# Patient Record
Sex: FEMALE | Race: WHITE | Employment: OTHER
[De-identification: names, ages, dates, MRNs, and addresses within clinical notes are randomized per-mention and may not be internally consistent; named-entity substitution may affect disease eponyms.]

---

## 2017-02-02 ENCOUNTER — SURGERY (OUTPATIENT)
Age: 77
End: 2017-02-02

## 2017-02-02 ENCOUNTER — HOSPITAL ENCOUNTER (OUTPATIENT)
Age: 77
Setting detail: OUTPATIENT SURGERY
Discharge: HOME | End: 2017-02-02
Attending: INTERNAL MEDICINE | Admitting: INTERNAL MEDICINE
Payer: MEDICARE

## 2017-02-02 VITALS
SYSTOLIC BLOOD PRESSURE: 118 MMHG | DIASTOLIC BLOOD PRESSURE: 66 MMHG | RESPIRATION RATE: 18 BRPM | HEIGHT: 56 IN | WEIGHT: 120 LBS | HEART RATE: 72 BPM | BODY MASS INDEX: 26.99 KG/M2 | TEMPERATURE: 97.9 F | OXYGEN SATURATION: 97 %

## 2017-02-02 LAB
GLUCOSE BLD STRIP.AUTO-MCNC: 89 MG/DL (ref 65–100)
SERVICE CMNT-IMP: NORMAL

## 2017-02-02 PROCEDURE — 74011000258 HC RX REV CODE- 258: Performed by: INTERNAL MEDICINE

## 2017-02-02 PROCEDURE — 99153 MOD SED SAME PHYS/QHP EA: CPT

## 2017-02-02 PROCEDURE — 74011250636 HC RX REV CODE- 250/636: Performed by: INTERNAL MEDICINE

## 2017-02-02 PROCEDURE — 77030014243 HC BND LIG VRCES BSC -D: Performed by: INTERNAL MEDICINE

## 2017-02-02 PROCEDURE — 76040000019: Performed by: INTERNAL MEDICINE

## 2017-02-02 PROCEDURE — 99152 MOD SED SAME PHYS/QHP 5/>YRS: CPT

## 2017-02-02 PROCEDURE — 82962 GLUCOSE BLOOD TEST: CPT

## 2017-02-02 RX ORDER — SODIUM CHLORIDE 9 MG/ML
50 INJECTION, SOLUTION INTRAVENOUS CONTINUOUS
Status: DISCONTINUED | OUTPATIENT
Start: 2017-02-02 | End: 2017-02-02 | Stop reason: HOSPADM

## 2017-02-02 RX ORDER — SODIUM CHLORIDE 0.9 % (FLUSH) 0.9 %
5-10 SYRINGE (ML) INJECTION AS NEEDED
Status: DISCONTINUED | OUTPATIENT
Start: 2017-02-02 | End: 2017-02-02 | Stop reason: HOSPADM

## 2017-02-02 RX ORDER — FENTANYL CITRATE 50 UG/ML
50-200 INJECTION, SOLUTION INTRAMUSCULAR; INTRAVENOUS
Status: DISCONTINUED | OUTPATIENT
Start: 2017-02-02 | End: 2017-02-02 | Stop reason: HOSPADM

## 2017-02-02 RX ORDER — MIDAZOLAM HYDROCHLORIDE 1 MG/ML
5-10 INJECTION, SOLUTION INTRAMUSCULAR; INTRAVENOUS
Status: DISCONTINUED | OUTPATIENT
Start: 2017-02-02 | End: 2017-02-02 | Stop reason: HOSPADM

## 2017-02-02 RX ORDER — DEXTROMETHORPHAN/PSEUDOEPHED 2.5-7.5/.8
1.2 DROPS ORAL
Status: DISCONTINUED | OUTPATIENT
Start: 2017-02-02 | End: 2017-02-02 | Stop reason: HOSPADM

## 2017-02-02 RX ORDER — EPINEPHRINE 0.1 MG/ML
1 INJECTION INTRACARDIAC; INTRAVENOUS
Status: DISCONTINUED | OUTPATIENT
Start: 2017-02-02 | End: 2017-02-02 | Stop reason: HOSPADM

## 2017-02-02 RX ORDER — ATROPINE SULFATE 0.1 MG/ML
0.5 INJECTION INTRAVENOUS
Status: DISCONTINUED | OUTPATIENT
Start: 2017-02-02 | End: 2017-02-02 | Stop reason: HOSPADM

## 2017-02-02 RX ORDER — FLUMAZENIL 0.1 MG/ML
0.2 INJECTION INTRAVENOUS
Status: DISCONTINUED | OUTPATIENT
Start: 2017-02-02 | End: 2017-02-02 | Stop reason: HOSPADM

## 2017-02-02 RX ORDER — SODIUM CHLORIDE 0.9 % (FLUSH) 0.9 %
5-10 SYRINGE (ML) INJECTION EVERY 8 HOURS
Status: DISCONTINUED | OUTPATIENT
Start: 2017-02-02 | End: 2017-02-02 | Stop reason: HOSPADM

## 2017-02-02 RX ORDER — NALOXONE HYDROCHLORIDE 0.4 MG/ML
0.4 INJECTION, SOLUTION INTRAMUSCULAR; INTRAVENOUS; SUBCUTANEOUS
Status: DISCONTINUED | OUTPATIENT
Start: 2017-02-02 | End: 2017-02-02 | Stop reason: HOSPADM

## 2017-02-02 RX ADMIN — MIDAZOLAM HYDROCHLORIDE 2 MG: 1 INJECTION, SOLUTION INTRAMUSCULAR; INTRAVENOUS at 13:01

## 2017-02-02 RX ADMIN — FENTANYL CITRATE 50 MCG: 50 INJECTION, SOLUTION INTRAMUSCULAR; INTRAVENOUS at 12:56

## 2017-02-02 RX ADMIN — MIDAZOLAM HYDROCHLORIDE 2 MG: 1 INJECTION, SOLUTION INTRAMUSCULAR; INTRAVENOUS at 12:56

## 2017-02-02 RX ADMIN — FENTANYL CITRATE 25 MCG: 50 INJECTION, SOLUTION INTRAMUSCULAR; INTRAVENOUS at 13:01

## 2017-02-02 RX ADMIN — MIDAZOLAM HYDROCHLORIDE 1 MG: 1 INJECTION, SOLUTION INTRAMUSCULAR; INTRAVENOUS at 13:04

## 2017-02-02 RX ADMIN — FENTANYL CITRATE 25 MCG: 50 INJECTION, SOLUTION INTRAMUSCULAR; INTRAVENOUS at 13:04

## 2017-02-02 RX ADMIN — MIDAZOLAM HYDROCHLORIDE 1 MG: 1 INJECTION, SOLUTION INTRAMUSCULAR; INTRAVENOUS at 12:59

## 2017-02-02 RX ADMIN — SODIUM CHLORIDE 50 ML: 900 INJECTION, SOLUTION INTRAVENOUS at 13:31

## 2017-02-02 RX ADMIN — FENTANYL CITRATE 25 MCG: 50 INJECTION, SOLUTION INTRAMUSCULAR; INTRAVENOUS at 12:59

## 2017-02-02 NOTE — PROCEDURES
93 Alan Burgos MD, KATY Kim PA-C Guinevere Nice, MD, [address], MD Lilly Rinaldi NP Buckner Jest, NP [employer]     217 [facility], [address] [place] [address]  22.     [phone]     FAX: [address] [place] [address], [address] [facility], [address]     [phone]     FAX: [phone]         UPPER ENDOSCOPY PROCEDURE NOTE    Alden Melendez  1940    INDICATION: Cirrhosis. Screening for esophageal varices with variceal ligation if indicated. : Chaim Ford MD    ANESTHESIA/SEDATION: Versed 6 mg and Fentanyl 125 mcg    PROCEDURE DESCRIPTION:  Infomed consent was obtained from the patient for the procedure. All risks and benefits of the procedure explained. The patient was taken to the endoscopy suite and placed in the left lateral decubitus position. Following sequential administration of sedation to doses as indicated above the endoscope was inserted into the mouth and advanced under direct vision to the second portion of the duodenum. Careful inspection of upper gastrointestinal tract was made as the endoscope was inserted and withdrawn. Retroflexion of the endoscope to view of the cardia of the stomach was performed. After withdrawing the endoscope the banding devise was placed on the tip of the endoscope. The scope was then reinserted under direct inspection and advanced to the esophagus. Banding of esophageal varices was performed as described below. The scope was then removed. FINDINGS:  Esophagus:  A few small esophageal varices were identified. Banding of esophageal varices was performed. Excellent hemostasis was achieved after banding. Stomach:   Mild portal hypertensive gastropathy of the body of the stomach.   No gastric varicies identified. Duodenum:   Normal bulb and second portion    INTERVENTION:   1 bands placed were placed on esophageal varices. COMPLICATIONS: None. The patient tolerated the procedure well. EBL: Negligible. RECOMMENDATIONS:  Observe until discharge parameters are achieved. Liquid diet today. Soft food tomorrow. Resume general diet thereafter. Repeat endoscopy to reassess varices and need for additional banding in 6 months. Follow-up Liver [place] [facility] office as scheduled.       Chico Prasad MD  2/2/2017  1:10 PM

## 2017-02-02 NOTE — H&P
[address], MD, FACP, [facility]     April KATY Saavedra PA-C [place] Calin, MD, MD Lilly Jordan NP     [facility], KATY        1483 [facility], [address] [place] [address]  22.     [phone]     FAX: [address] [place] [address], [address] [place] [address], 300 [facility] [address]     [phone]     FAX: [phone]         PRE-PROCEDURE NOTE - EGD    H and P from last office visit reviewed. Allergies reviewed. Out-patient medicaton list reviewed. Patient Active Problem List   Diagnosis Code    Thrombocytopenia ([facility] [address] D69.6    Type II diabetes mellitus ([facility] [address] E11.9    Hypertension I10    Hypercholesterolemia E78.00    Cirrhosis, cryptogenic (HCC) K74.69       Allergies   Allergen Reactions    Percodan [Oxycodone Hcl-Oxycodone-Asa] Other (comments)       No current facility-administered medications on file prior to encounter. Current Outpatient Prescriptions on File Prior to Encounter   Medication Sig Dispense Refill    glipiZIDE SR (GLUCOTROL) 10 mg CR tablet TAKE ONE TABLET BY MOUTH EVERY DAY  5    lisinopril (PRINIVIL, ZESTRIL) 10 mg tablet TAKE ONE TABLET BY MOUTH EVERY DAY  5    metFORMIN (GLUCOPHAGE) 1,000 mg tablet TAKE ONE TABLET BY MOUTH TWICE A DAY WITH MEALS  5    sertraline (ZOLOFT) 50 mg tablet TAKE ONE (1) TABLET(S) ONCE DAILY  0    simvastatin (ZOCOR) 10 mg tablet TAKE ONE TABLET BY MOUTH EVERY DAY IN THE EVENING  5       For EGD to assess for esophageal and gastric varices. Plan to perform banding if indicated based upon variceal size and appearance. The risks of the procedure were discussed with the patient. These included reaction to anesthesia, pain, perforation and bleeding. All questions were answered. The patient wishes to proceed with the procedure.     PHYSICAL EXAMINATION:  VS: per nursing note  General: No acute distress. Eyes: Sclera anicteric. ENT: No oral lesions. Thyroid normal.  Nodes: No adenopathy. Skin: No spider angiomata. No jaundice. No palmar erythema. Respiratory: Lungs clear to auscultation. Cardiovascular: Regular heart rate. No murmurs. No JVD. Abdomen: Soft non-tender, liver size normal to percussion/palpation. Spleen not palpable. No obvious ascites. Extremities: No edema. No muscle wasting. No gross arthritic changes. Neurologic: Alert and oriented. Cranial nerves grossly intact. No asterixis. MOST RECENT LABORATORY STUDIES:  Lab Results   Component Value Date/Time    WBC 6.0 12/19/2016 03:25 PM    HGB 12.8 12/19/2016 03:25 PM    HCT 39.1 12/19/2016 03:25 PM    PLATELET 109 47/60/4025 03:25 PM    MCV 92 12/19/2016 03:25 PM     Lab Results   Component Value Date/Time    INR 1.1 12/19/2016 03:25 PM    Prothrombin time 11.6 12/19/2016 03:25 PM       ASSESSMENT AND PLAN:  EGD to assess for esophageal and/or gastric varices. Conscious sedation with fentanyl and versed.     Lionel Mcgee MD  [facility] [place] of [address] [place] [address] [address] [place] [address] Nevin Medrano[address] [facility]. [phone]

## 2017-02-02 NOTE — PROGRESS NOTES
[id number] Patient had EGD with Dr Lisa Hill. Monitored patient during conscious sedation without incident. See flow sheets. 1 Esophageal varices banded.

## 2017-02-02 NOTE — IP AVS SNAPSHOT
[address] 
[phone] Patient: Ladonna Glez MRN: [id number] [facility]:16[birth year]89/[birth year]26 You are allergic to the following Allergen Reactions Percodan (Oxycodone Hcl-Oxycodone-Asa) Other (comments) Recent Documentation Height Weight Breastfeeding? BMI OB Status Smoking Status 1.422 m 54.4 kg No 26.9 kg/m2 Menopause Former Smoker Emergency Contacts Name Discharge Info Relation Home Work Mobile RdbayleeJavier DISCHARGE CAREGIVER [3] Spouse [3] [phone] Kenrick Bardales  Spouse [3] [phone] About your hospitalization You were admitted on:  February 2, 2017 You last received care in the:  [facility] ENDOSCOPY You were discharged on:  February 2, 2017 Unit phone number:  [phone] Why you were hospitalized Your primary diagnosis was:  Not on File Providers Seen During Your Hospitalizations Provider Role Specialty Primary office phone Arch MD Anibal Attending Provider Hepatology [phone] Your Primary Care Physician (PCP) Primary Care Physician Office Phone Office Fax Edith Alexander [phone] Follow-up Information Follow up With Details Comments Contact Info Chapis Carroll MD   [address] [place] [address] [address] 
[phone] Your Appointments Thursday February 16, 2017  9:30 AM EST Fibroscan with Milena Villalta NP Liver [facility] ([facility]) [address] [place] [address] [phone] [address] [place] [address]  
[phone] Current Discharge Medication List  
  
CONTINUE these medications which have NOT CHANGED Dose & Instructions Dispensing Information Comments Morning Noon Evening Bedtime  
 glipiZIDE SR 10 mg CR tablet Commonly known as:  GLUCOTROL XL Your next dose is: Today, Tomorrow Other:  _________ TAKE ONE TABLET BY MOUTH EVERY DAY Refills:  5  
     
   
   
   
  
 lisinopril 10 mg tablet Commonly known as:  Monaolga SamsBenewah Your next dose is: Today, Tomorrow Other:  _________ TAKE ONE TABLET BY MOUTH EVERY DAY Refills:  5  
     
   
   
   
  
 metFORMIN 1,000 mg tablet Commonly known as:  GLUCOPHAGE Your next dose is: Today, Tomorrow Other:  _________ TAKE ONE TABLET BY MOUTH TWICE A DAY WITH MEALS Refills:  5  
     
   
   
   
  
 sertraline 50 mg tablet Commonly known as:  ZOLOFT Your next dose is: Today, Tomorrow Other:  _________ TAKE ONE (1) TABLET(S) ONCE DAILY Refills:  0  
     
   
   
   
  
 simvastatin 10 mg tablet Commonly known as:  ZOCOR Your next dose is: Today, Tomorrow Other:  _________ TAKE ONE TABLET BY MOUTH EVERY DAY IN THE EVENING Refills:  5 Discharge Instructions [address] [place] [address] Paula Alex MD, Keshav Schmidt, GIGI Larios MD, MD Lilly Cardenas NP Elmyra Pavy, NP 4630 [facility], [address] [place] [address] 22. 
   [phone] FAX: [address] [place] [address], [address] [place] [address], 300 [facility] [address] 
   [phone] FAX: [phone] ENDOSCOPY WITH BANDING DISCHARGE INSTRUCTIONS Zeeshan Justice 1940 Date: 2/2/2017 DISCOMFORT: 
Use lozenges or warm salt water gargle for sore thoat Apply warm compress to IV site if red. If redness or soreness persists call the office. You may experience gas and bloating. Walking and belching will help relieve this.  
You may experience chest pain or discomfort or feel as though food is \"sticking\" in your food pipe for a few days after the procedure. This is a normal feeling after banding of esophageal varices. DIET: 
Regular food may dislodge the bands placed on the varices. For this reason you should only have liquid for the rest of today. Eat only soft food that does not need to be chewed all day tomorrow. You may advance to your regular diet in 2 days. ACTIVITY: 
Spend the remainder of the day resting. Avoid any strenuous activity. You may not operate a vehicle for 12 hours. You may not engage in an occupation involving machinery or appliances for rest of today. Avoid making any critical decisions for at least 24 hour. Call the [place]  [email or web address] if you have any of the following: 
Increasing chest or abdominal pain, nausea, vomiting, vomiting blood, abdominal distension or swelling, fever or chills, bloody discharge from nose or mouth or shortness of breath. Follow-up Instructions: 
Call Dr. Devante Rolon for any questions or problems at the phone number listed above. If a biopsy was performed, you will be contacted by the office staff or Dr Devante Rolon within 1 week. If you have not heard from us by then you may call the office at the phone number listed above to inquire about the results. ENDOSCOPY FINDINGS: 
A few small varices were found in the esophagus (food tube). 1 bands were placed to seal the varices and reduce the risk of bleeding. DISCHARGE SUMMARY from the Nurse: The following personal items collected during your admission are returned to you:  
Dental Appliance: Dental Appliances: At home Vision: Visual Aid: Glasses, With patient Hearing Aid:   
Jewelry:   
Clothing:   
Other Valuables:   
Valuables sent to safe:   
 
 
 
  
Esophageal Varices: Care Instructions Your Care Instructions Esophageal varices (say \"ee-sof-uh-ALEXIS-ul MBNO-is-jifh\") are veins in your esophagus that are bigger than normal. Your esophagus is a tube.  It carries food from your throat to your stomach. These veins get big because of extra pressure. This pressure makes the walls of the veins weak. Then they can rupture and cause very serious bleeding. This problem is usually found in people who have serious liver disease. Treatments include medicines and procedures to help lower the pressure in the veins. Talk with your doctor about the best treatment for you. If you have bleeding from this problem, there is a risk that it will happen again. In this case, it's important to go back and follow up with your doctor. Follow-up care is a key part of your treatment and safety. Be sure to make and go to all appointments, and call your doctor if you are having problems. It's also a good idea to know your test results and keep a list of the medicines you take. How can you care for yourself at home? · Be safe with medicines. Take your medicines exactly as prescribed. Call your doctor if you think you are having a problem with your medicine. You will get more details on the specific medicines your doctor prescribes. · Talk to your doctor before you take any other medicines. These include over-the-counter medicines, vitamins, and herbal products. · Avoid aspirin, ibuprofen (Advil, Motrin), and naproxen (Aleve). These can cause sores in your stomach or esophagus. · Do not drink alcohol. It increases your risk of bleeding. It can also make liver damage worse. Tell your doctor if you need help to quit. Counseling, support groups, and sometimes medicines can help you stay sober. When should you call for help? Call 911 anytime you think you may need emergency care. For example, call if: 
· You vomit blood or what looks like coffee grounds. · Your stools are maroon or very bloody. · You passed out (lost consciousness). · You feel very sleepy. Call your doctor now or seek immediate medical care if: 
· You are dizzy or lightheaded, or you feel like you may faint. · Your stools are black and look like tar, or they have streaks of blood. · You have trouble breathing. Watch closely for changes in your health, and be sure to contact your doctor if you have any problems. Where can you learn more? Go to http://niesha-zeina.info/. Enter K396 in the search box to learn more about \"Esophageal Varices: Care Instructions. \" Current as of: August 9, 2016 Content Version: 11.1 © 5610-0094 OpenWhere. Care instructions adapted under license by [employer]ak[employer] (which disclaims liability or warranty for this information). If you have questions about a medical condition or this instruction, always ask your healthcare professional. [address] any warranty or liability for your use of this information. Discharge Orders None Introducing [facility]! Tessa Fuentes introduces AquaBlok patient portal. Now you can access parts of your medical record, email your doctor's office, and request medication refills online. 1. In your internet browser, go to https://5 CUPS and some sugar/Fluidigm 2. Click on the First Time User? Click Here link in the Sign In box. You will see the New Member Sign Up page. 3. Enter your AquaBlok Access Code exactly as it appears below. You will not need to use this code after youve completed the sign-up process. If you do not sign up before the expiration date, you must request a new code. · AquaBlok Access Code: [email or web address] Expires: 5/3/2017 11:14 AM 
 
4. Enter the last four digits of your Social Security Number (xxxx) and Date of Birth (mm/dd/yyyy) as indicated and click Submit. You will be taken to the next sign-up page. 5. Create a AquaBlok ID. This will be your AquaBlok login ID and cannot be changed, so think of one that is secure and easy to remember. 6. Create a Windtronicst password. You can change your password at any time. 7. Enter your Password Reset Question and Answer. This can be used at a later time if you forget your password. 8. Enter your e-mail address. You will receive e-mail notification when new information is available in [address] 9. Click Sign Up. You can now view and download portions of your medical record. 10. Click the Download Summary menu link to download a portable copy of your medical information. If you have questions, please visit the Frequently Asked Questions section of the Ciapple website. Remember, Ciapple is NOT to be used for urgent needs. For medical emergencies, dial 911. Now available from your iPhone and Android! General Information Please provide this summary of care documentation to your next provider. Patient Signature:  ____________________________________________________________ Date:  ____________________________________________________________  
  
[facility] Provider Signature:  ____________________________________________________________ Date:  ____________________________________________________________

## 2017-02-02 NOTE — DISCHARGE INSTRUCTIONS
93 Alan Burgos MD, [address] KATY Rios PA-C Pattricia Jesus, MD, [address], MD Lilly De Souza NP Edilia Smock, NP        [facility]     217 [facility], [address] [place] [address]  22.     [phone]     FAX: [address] [place] [address], [address] [facility], [address]     [phone]     FAX: [phone]         ENDOSCOPY WITH BANDING DISCHARGE INSTRUCTIONS    Nancy Johny  1940  Date: 2/2/2017    DISCOMFORT:  Use lozenges or warm salt water gargle for sore thoat  Apply warm compress to IV site if red. If redness or soreness persists call the office. You may experience gas and bloating. Walking and belching will help relieve this. You may experience chest pain or discomfort or feel as though food is \"sticking\" in your food pipe for a few days after the procedure. This is a normal feeling after banding of esophageal varices. DIET:  Regular food may dislodge the bands placed on the varices. For this reason you should only have liquid for the rest of today. Eat only soft food that does not need to be chewed all day tomorrow. You may advance to your regular diet in 2 days. ACTIVITY:  Spend the remainder of the day resting. Avoid any strenuous activity. You may not operate a vehicle for 12 hours. You may not engage in an occupation involving machinery or appliances for rest of today. Avoid making any critical decisions for at least 24 hour. Call the [address] [phone] of [phone] [email or web address] if you have any of the following:  Increasing chest or abdominal pain, nausea, vomiting, vomiting blood, abdominal distension or swelling, fever or chills, bloody discharge from nose or mouth or shortness of breath.     Follow-up Instructions:  Call Dr. Ev Dawkins for any questions or problems at the phone number listed above. If a biopsy was performed, you will be contacted by the office staff or Dr Pepe Rodriguez within 1 week. If you have not heard from us by then you may call the office at the phone number listed above to inquire about the results. ENDOSCOPY FINDINGS:  A few small varices were found in the esophagus (food tube). 1 bands were placed to seal the varices and reduce the risk of bleeding. DISCHARGE SUMMARY from the Nurse: The following personal items collected during your admission are returned to you:   Dental Appliance: Dental Appliances: At home  Vision: Visual Aid: Glasses, With patient  Hearing Aid:    Tonna Palma:    Clothing:    Other Valuables:    Valuables sent to safe:             Esophageal Varices: Care Instructions  Your Care Instructions    Esophageal varices (say \"ee-sof-uh-ALEXIS-ul GQNU-bt-qnwi\") are veins in your esophagus that are bigger than normal. Your esophagus is a tube. It carries food from your throat to your stomach. These veins get big because of extra pressure. This pressure makes the walls of the veins weak. Then they can rupture and cause very serious bleeding. This problem is usually found in people who have serious liver disease. Treatments include medicines and procedures to help lower the pressure in the veins. Talk with your doctor about the best treatment for you. If you have bleeding from this problem, there is a risk that it will happen again. In this case, it's important to go back and follow up with your doctor. Follow-up care is a key part of your treatment and safety. Be sure to make and go to all appointments, and call your doctor if you are having problems. It's also a good idea to know your test results and keep a list of the medicines you take. How can you care for yourself at home? · Be safe with medicines. Take your medicines exactly as prescribed. Call your doctor if you think you are having a problem with your medicine.  You will get more details on the specific medicines your doctor prescribes. · Talk to your doctor before you take any other medicines. These include over-the-counter medicines, vitamins, and herbal products. · Avoid aspirin, ibuprofen (Advil, Motrin), and naproxen (Aleve). These can cause sores in your stomach or esophagus. · Do not drink alcohol. It increases your risk of bleeding. It can also make liver damage worse. Tell your doctor if you need help to quit. Counseling, support groups, and sometimes medicines can help you stay sober. When should you call for help? Call 911 anytime you think you may need emergency care. For example, call if:  · You vomit blood or what looks like coffee grounds. · Your stools are maroon or very bloody. · You passed out (lost consciousness). · You feel very sleepy. Call your doctor now or seek immediate medical care if:  · You are dizzy or lightheaded, or you feel like you may faint. · Your stools are black and look like tar, or they have streaks of blood. · You have trouble breathing. Watch closely for changes in your health, and be sure to contact your doctor if you have any problems. Where can you learn more? Go to http://niesha-zeina.info/. Enter P713 in the search box to learn more about \"Esophageal Varices: Care Instructions. \"  Current as of: August 9, 2016  Content Version: 11.1  © 6854-5903 [employer]. Care instructions adapted under license by [employer] Her[employer] (which disclaims liability or warranty for this information). If you have questions about a medical condition or this instruction, always ask your healthcare professional. [address] any warranty or liability for your use of this information.

## 2017-02-16 ENCOUNTER — OFFICE VISIT (OUTPATIENT)
Dept: HEMATOLOGY | Age: 77
End: 2017-02-16

## 2017-02-16 VITALS
RESPIRATION RATE: 18 BRPM | DIASTOLIC BLOOD PRESSURE: 61 MMHG | TEMPERATURE: 100.1 F | HEIGHT: 56 IN | HEART RATE: 74 BPM | WEIGHT: 122.5 LBS | SYSTOLIC BLOOD PRESSURE: 128 MMHG | OXYGEN SATURATION: 95 % | BODY MASS INDEX: 27.56 KG/M2

## 2017-02-16 DIAGNOSIS — D69.6 THROMBOCYTOPENIA (HCC): ICD-10-CM

## 2017-02-16 DIAGNOSIS — K74.69 CIRRHOSIS, CRYPTOGENIC (HCC): Primary | ICD-10-CM

## 2017-02-16 NOTE — PROGRESS NOTES
Estephania Shankar MD, [address]     April KATY Preciado PA-C Sloan Blade, MD, [address], MD Amy Maeola Romance, NP Erving Rinne, NP        [address] [facility], [address] Tal Ramirez  22.     [phone]     FAX: [address] [place] [address] Drive, [address] [facility], [address]     [phone]     FAX: [phone]     Patient Care Team:  Geri Stevens MD as PCP - General (Family Practice)    Patient Active Problem List   Diagnosis Code    Thrombocytopenia [facility]) D69.6    Type II diabetes mellitus ([facility] [address] E11.9    Hypertension I10    Hypercholesterolemia E78.00    Cirrhosis, cryptogenic ([facility] [address] K74.69       Jesus[facility]pher[facility] regarding management of cryptogenic cirrhosis. The active problem list, all pertinent past medical history, medications, radiologic findings and laboratory findings related to the liver disorder were reviewed with the patient. The patient is a 68 y.o.  female who was first found to have chronic liver disease and cirrhosis in 11/2016 after she was noted to have thrombocytopenia. An assessment of liver fibrosis with biopsy or elastography has not been performed. Patient presents to the clinic today for a Fibroscan to assess liver fibrosis or scarring. The patient has not developed any major complications of cirrhosis to date. The patient has not developed ascites. The patient has not developed hepatic encephalopathy. The most recent laboratory studies indicate that the liver transaminases are normal, ALP is normal, tests of hepatic synthetic and metabolic function are normal, and the platelet count is depressed. The patient has no symptoms which could be attributed to the liver disorder.  The patient completes all daily activities without any functional limitations. The patient has not experienced fatigue, pain in the right side over the liver, problems concentrating, swelling of the abdomen, swelling of the lower extremities, hematemesis, hematochezia. ALLERGIES  Allergies   Allergen Reactions    Percodan [Oxycodone Hcl-Oxycodone-Asa] Other (comments)     MEDICATIONS  Current Outpatient Prescriptions   Medication Sig    glipiZIDE SR (GLUCOTROL) 10 mg CR tablet TAKE ONE TABLET BY MOUTH EVERY DAY    lisinopril (PRINIVIL, ZESTRIL) 10 mg tablet TAKE ONE TABLET BY MOUTH EVERY DAY    metFORMIN (GLUCOPHAGE) 1,000 mg tablet TAKE ONE TABLET BY MOUTH TWICE A DAY WITH MEALS    sertraline (ZOLOFT) 50 mg tablet TAKE ONE (1) TABLET(S) ONCE DAILY    simvastatin (ZOCOR) 10 mg tablet TAKE ONE TABLET BY MOUTH EVERY DAY IN THE EVENING     No current facility-administered medications for this visit. SYSTEM REVIEW NOT RELATED TO LIVER DISEASE OR REVIEWED ABOVE:  Constitution systems: Negative for fever, chills, weight gain, weight loss. Eyes: Negative for visual changes. ENT: Negative for sore throat, painful swallowing. Respiratory: Negative for cough, hemoptysis, SOB. Cardiology: Negative for chest pain, palpitations. GI:  Negative for constipation or diarrhea. : Negative for urinary frequency, dysuria, hematuria, nocturia. Skin: Negative for rash. Hematology: Negative for easy bruising, blood clots. Musculo-skelatal: Negative for back pain, muscle pain, weakness. Neurologic: Negative for headaches, dizziness, vertigo, memory problems not related to HE. Psychology: Negative for anxiety, depression. FAMILY HISTORY:  The father  of cirrhosis probably from chronic HCV. The mother  of cancer. There is no family history of liver disease. SOCIAL HISTORY:  The patient is . The patient has 2 children, 4 stepchildren, and alot grandchildren. The patient has never used tobacco products.     The patient has never consumed significant amounts of alcohol. The patient used to work in [employer]. The patient has not worked since 2009. PHYSICAL EXAMINATION:  Visit Vitals    /61 (BP 1 Location: Right arm, BP Patient Position: Sitting)    Pulse 74    Temp 100.1 °F (37.8 °C) (Tympanic)    Resp 18    Ht 4' 8\" (1.422 m)    Wt 122 lb 8 oz (55.6 kg)    SpO2 95%    BMI 27.46 kg/m2     General: No acute distress. Eyes: Sclera anicteric. ENT: No oral lesions. Thyroid normal.  Nodes: No adenopathy. Skin: No spider angiomata. No jaundice. No palmar erythema. Respiratory: Lungs clear to auscultation. Cardiovascular: Regular heart rate. No murmurs. No JVD. Abdomen: Soft non-tender. Liver size normal to percussion/palpation. Spleen not palpable. No obvious ascites. Extremities: No edema. No muscle wasting. No gross arthritic changes. Neurologic: Alert and oriented. Cranial nerves grossly intact. No asterixis. LABORATORY STUDIES:  Liver [facility] Ref Rng & Units 2/16/2017 12/19/2016   WBC 3.4 - 10.8 x10E3/uL  6.0   ANC 1.4 - 7.0 x10E3/uL  3.7   HGB 11.1 - 15.9 g/dL  12.8    - 379 x10E3/uL  102 (L)   PLT       - 379 X10E3/uL     INR 0.8 - 1.2  1.1   AST 0 - 40 IU/L 40 35   ALT 0 - 32 IU/L 31 21   Alk Phos 39 - 117 IU/L 73 61   Bili, Total 0.0 - 1.2 mg/dL 0.6 0.8   Bili, Direct 0.00 - 0.40 mg/dL  0.30   Albumin 3.5 - 4.8 g/dL 3.9 4.5   BUN 8 - 27 mg/dL 12 17   Creat 0.57 - 1.00 mg/dL 0.93 0.86   Na 134 - 144 mmol/L 146 (H) 145 (H)   K 3.5 - 5.2 mmol/L 5.0 4.7   Cl 96 - 106 mmol/L 106 106   CO2 18 - 29 mmol/L 22 21   Glucose 65 - 99 mg/dL 86 84     SEROLOGIES:  Serologies Latest Ref Rng 9/29/2016   Hep B Surface Ag Negative Negative   Hep B Core Ab, Total Negative Negative   Hep B Surface AB QL  Reactive   Hep C Ab 0.0 - 0.9 s/co ratio <0.1   Ferritin 15 - 150 ng/mL 55     LIVER HISTOLOGY:  2/16/2017. [facility]. EkPa was 21.1. Suggested fibrosis level is F4. ENDOSCOPIC PROCEDURES:  2/2017. EGD by MLS. A few small esophageal varices were identified. Banding of esophageal varices was performed. Mild portal hypertensive gastropathy of the body of the stomach. No gastric varicies identified. RADIOLOGY:  10/2016. Ultrasound of liver. Echogenic consistent with cirrhosis. No liver mass lesions. No dilated bile ducts. No ascites. 11/2016. MRI scan abdomen. Changes consistent with cirrhosis. No liver mass lesions. No dilated bile ducts. No bile duct strictures. No ascites. 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 Results were discussed in detail with patient. This is most likely due to MANUEL. Liver transaminases are normal. Alkaline phosphatase is normal. Liver function is normal. The platelet count is normal.      Have performed laboratory testing to monitor liver function and degree of liver injury. This will include CMP. There is no reason to perform liver biopsy at this time. The patient has never developed any complications of cirrhosis to date. The CTP is 5. Child class A. The MELD score is 10. Esophageal varices. Recent EGD demonstrated small varices and were banded. Patient will be considered for [place] in the portal hypertension clinical trial.     Hepatic encephalopathy has not developed to date. There is no need for treatment with lactulose and/or Xifaxan at this time. No need to restrict dietary protein at this time. The patient was directed to continue all current medications at the current dosages. There are no contraindications for the patient to take any medications that are necessary for treatment of other medical issues. The patient was counseled regarding alcohol consumption. Thrombocytopenia secondary to cirrhosis. There is no evidence of overt bleeding.  There is no indication for platelet transfusions or pharmacologic treatment to increase the platelet count. Bone density to assess for osteoporosis has not been performed. The need for vaccination against viral hepatitis A and B will be assessed with serologic and instituted as appropriate. [facility] [address] screening has recently been performed and does not suggest [address] The next liver imaging study will be performed in 5/2017. All of the above issues were discussed with the patient. All questions were answered. The patient expressed a clear understanding of the above. [address] [place] [address] in 3-4 months.     Rafael Monahan NP  Liver [place] [address]  Ph: [phone]  Fax: [phone]  Email: [email or web address]

## 2017-02-16 NOTE — MR AVS SNAPSHOT
Visit Information Date & Time Provider Department Dept. Phone Encounter #  
 2/16/2017  9:30 AM April Luke Rojas NP [facility] [address] [phone] [id number] Follow-up Instructions Return in about 4 months (around 6/16/2017). Upcoming Health Maintenance Date Due HEMOGLOBIN A1C Q6M 1940 LIPID PANEL Q1 1940 FOOT EXAM Q1 11/12/1950 MICROALBUMIN Q1 11/12/1950 EYE EXAM RETINAL OR DILATED Q1 11/12/1950 DTaP/Tdap/Td series (1 - Tdap) 11/12/1961 ZOSTER VACCINE AGE 60> 11/12/2000 GLAUCOMA SCREENING Q2Y 11/12/2005 OSTEOPOROSIS SCREENING (DEXA) 11/12/2005 Pneumococcal 65+ Low/Medium Risk (1 of 2 - PCV13) 11/12/2005 MEDICARE YEARLY EXAM 11/12/2005 INFLUENZA AGE 9 TO ADULT 8/1/2016 Allergies as of 2/16/2017  Review Complete On: 2/16/2017 By: Clemente Whittaker Severity Noted Reaction Type Reactions Percodan [Oxycodone Hcl-oxycodone-asa]  09/29/2016    Other (comments) Current Immunizations  Reviewed on 10/25/2016 No immunizations on file. Not reviewed this visit You Were Diagnosed With   
  
 Codes Comments Cirrhosis, cryptogenic ([facility] [address]    -  Primary ICD-10-CM: P72.15 ICD-9-CM: 571.5 Thrombocytopenia ([facility] [address]     ICD-10-CM: D69.6 ICD-9-CM: 287.5 Vitals BP Pulse Temp Resp Height(growth percentile) 128/61 (BP 1 Location: Right arm, BP Patient Position: Sitting) 74 100.1 °F (37.8 °C) (Tympanic) 18 4' 8\" (1.422 m) Weight(growth percentile) SpO2 BMI OB Status Smoking Status 122 lb 8 oz (55.6 kg) 95% 27.46 kg/m2 Menopause Former Smoker BMI and BSA Data Body Mass Index Body Surface Area  
 27.46 kg/m 2 1.48 m 2 Preferred Pharmacy Pharmacy Name Phone [facility] [place] [address] [phone] Your Updated Medication List  
  
   
This list is accurate as of: 2/16/17 10:05 AM.  Always use your most recent med list.  
  
  
 glipiZIDE SR 10 mg CR tablet Commonly known as:  GLUCOTROL XL  
TAKE ONE TABLET BY MOUTH EVERY DAY  
  
 lisinopril 10 mg tablet Commonly known as:  PRINIVIL, ZESTRIL  
TAKE ONE TABLET BY MOUTH EVERY DAY  
  
 metFORMIN 1,000 mg tablet Commonly known as:  GLUCOPHAGE  
TAKE ONE TABLET BY MOUTH TWICE A DAY WITH MEALS  
  
 sertraline 50 mg tablet Commonly known as:  ZOLOFT  
TAKE ONE (1) TABLET(S) ONCE DAILY  
  
 simvastatin 10 mg tablet Commonly known as:  ZOCOR  
TAKE ONE TABLET BY MOUTH EVERY DAY IN THE EVENING We Performed the Following METABOLIC PANEL, COMPREHENSIVE [19739 CPT(R)] Follow-up Instructions Return in about 4 months (around 6/16/2017). To-Do List   
 05/16/2017 Imaging:  [facility] Introducing [facility] & HEALTH SERVICES! [facility] introduces YEVVO patient portal. Now you can access parts of your medical record, email your doctor's office, and request medication refills online. 1. In your internet browser, go to [email or web address] 2. Click on the First Time User? Click Here link in the Sign In box. You will see the New Member Sign Up page. 3. Enter your YEVVO Access Code exactly as it appears below. You will not need to use this code after youve completed the sign-up process. If you do not sign up before the expiration date, you must request a new code. · YEVVO Access Code: [email or web address] Expires: 5/3/2017 11:14 AM 
 
4. Enter the last four digits of your Social Security Number (xxxx) and Date of Birth (mm/dd/yyyy) as indicated and click Submit. You will be taken to the next sign-up page. 5. Create a YEVVO ID. This will be your YEVVO login ID and cannot be changed, so think of one that is secure and easy to remember. 6. Create a YEVVO password. You can change your password at any time. 7. Enter your Password Reset Question and Answer. This can be used at a later time if you forget your password. 8. Enter your e-mail address. You will receive e-mail notification when new information is available in [address] 9. Click Sign Up. You can now view and download portions of your medical record. 10. Click the Download Summary menu link to download a portable copy of your medical information. If you have questions, please visit the Frequently Asked Questions section of the Lenda website. Remember, Lenda is NOT to be used for urgent needs. For medical emergencies, dial 911. Now available from your iPhone and Android! Please provide this summary of care documentation to your next provider. Your primary care clinician is listed as Tanja Hazel. If you have any questions after today's visit, please call [phone].

## 2017-02-17 LAB
ALBUMIN SERPL-MCNC: 3.9 G/DL (ref 3.5–4.8)
ALBUMIN/GLOB SERPL: 1.6 {RATIO} (ref 1.1–2.5)
ALP SERPL-CCNC: 73 IU/L (ref 39–117)
ALT SERPL-CCNC: 31 IU/L (ref 0–32)
AST SERPL-CCNC: 40 IU/L (ref 0–40)
BILIRUB SERPL-MCNC: 0.6 MG/DL (ref 0–1.2)
BUN SERPL-MCNC: 12 MG/DL (ref 8–27)
BUN/CREAT SERPL: 13 (ref 11–26)
CALCIUM SERPL-MCNC: 8.9 MG/DL (ref 8.7–10.3)
CHLORIDE SERPL-SCNC: 106 MMOL/L (ref 96–106)
CO2 SERPL-SCNC: 22 MMOL/L (ref 18–29)
CREAT SERPL-MCNC: 0.93 MG/DL (ref 0.57–1)
GLOBULIN SER CALC-MCNC: 2.5 G/DL (ref 1.5–4.5)
GLUCOSE SERPL-MCNC: 86 MG/DL (ref 65–99)
POTASSIUM SERPL-SCNC: 5 MMOL/L (ref 3.5–5.2)
PROT SERPL-MCNC: 6.4 G/DL (ref 6–8.5)
SODIUM SERPL-SCNC: 146 MMOL/L (ref 134–144)

## 2017-05-09 ENCOUNTER — HOSPITAL ENCOUNTER (OUTPATIENT)
Dept: ULTRASOUND IMAGING | Age: 77
Discharge: HOME | End: 2017-05-09
Attending: NURSE PRACTITIONER
Payer: MEDICARE

## 2017-05-09 DIAGNOSIS — K74.69 CIRRHOSIS, CRYPTOGENIC (HCC): ICD-10-CM

## 2017-05-09 DIAGNOSIS — D69.6 THROMBOCYTOPENIA (HCC): ICD-10-CM

## 2017-05-09 PROCEDURE — 76705 ECHO EXAM OF ABDOMEN: CPT

## 2017-06-19 ENCOUNTER — OFFICE VISIT (OUTPATIENT)
Dept: HEMATOLOGY | Age: 77
End: 2017-06-19

## 2017-06-19 VITALS
SYSTOLIC BLOOD PRESSURE: 141 MMHG | WEIGHT: 122 LBS | TEMPERATURE: 97.9 F | BODY MASS INDEX: 27.35 KG/M2 | DIASTOLIC BLOOD PRESSURE: 61 MMHG | HEART RATE: 75 BPM | OXYGEN SATURATION: 95 %

## 2017-06-19 DIAGNOSIS — K76.6 PORTAL HYPERTENSION (HCC): ICD-10-CM

## 2017-06-19 DIAGNOSIS — I85.10 SECONDARY ESOPHAGEAL VARICES WITHOUT BLEEDING (HCC): ICD-10-CM

## 2017-06-19 DIAGNOSIS — D69.6 THROMBOCYTOPENIA (HCC): ICD-10-CM

## 2017-06-19 DIAGNOSIS — K74.69 CIRRHOSIS, CRYPTOGENIC (HCC): Primary | ICD-10-CM

## 2017-06-19 NOTE — MR AVS SNAPSHOT
Visit Information Date & Time Provider Department Dept. Phone Encounter #  
 6/19/2017 11:30 AM Milena Dodson NP [facility] [address] [facility] [id number] Your Appointments 6/19/2017 11:30 AM  
Follow Up with Milena Minaya NP [facility] [address] ([facility] Appt Note: Follow Up  
 [address] [facility] [phone] [facility] [address]  
[address] [facility] [address] Upcoming Health Maintenance Date Due HEMOGLOBIN A1C Q6M 1940 LIPID PANEL Q1 1940 FOOT EXAM Q1 11/12/1950 MICROALBUMIN Q1 11/12/1950 EYE EXAM RETINAL OR DILATED Q1 11/12/1950 DTaP/Tdap/Td series (1 - Tdap) 11/12/1961 ZOSTER VACCINE AGE 60> 11/12/2000 GLAUCOMA SCREENING Q2Y 11/12/2005 OSTEOPOROSIS SCREENING (DEXA) 11/12/2005 Pneumococcal 65+ Low/Medium Risk (1 of 2 - PCV13) 11/12/2005 MEDICARE YEARLY EXAM 11/12/2005 INFLUENZA AGE 9 TO ADULT 8/1/2017 Allergies as of 6/19/2017  Review Complete On: 6/19/2017 By: Milena Minaya NP Severity Noted Reaction Type Reactions Percodan [Oxycodone Hcl-oxycodone-asa]  09/29/2016    Other (comments) Current Immunizations  Reviewed on 10/25/2016 No immunizations on file. Not reviewed this visit You Were Diagnosed With   
  
 Codes Comments Cirrhosis, cryptogenic ([address]    -  Primary ICD-10-CM: W19.59 ICD-9-CM: 571.5 Thrombocytopenia ([address]     ICD-10-CM: D69.6 ICD-9-CM: 287.5 Portal hypertension (HCC)     ICD-10-CM: K76.6 ICD-9-CM: 572.3 Secondary esophageal varices without bleeding (HCC)     ICD-10-CM: I85.10 ICD-9-CM: 456.21 Vitals BP Pulse Temp Weight(growth percentile) SpO2 BMI  
 141/61 75 97.9 °F (36.6 °C) (Oral) 122 lb (55.3 kg) 95% 27.35 kg/m2 OB Status Smoking Status Menopause Former Smoker BMI and BSA Data Body Mass Index Body Surface Area 27.35 kg/m 2 1.48 m 2 Preferred Pharmacy Pharmacy Name Phone [facility] [place]  [phone] Your Updated Medication List  
  
   
This list is accurate as of: 6/19/17 11:28 AM.  Always use your most recent med list.  
  
  
  
  
 glipiZIDE SR 10 mg CR tablet Commonly known as:  GLUCOTROL XL  
TAKE ONE TABLET BY MOUTH EVERY DAY  
  
 lisinopril 10 mg tablet Commonly known as:  PRINIVIL, ZESTRIL  
TAKE ONE TABLET BY MOUTH EVERY DAY  
  
 metFORMIN 1,000 mg tablet Commonly known as:  GLUCOPHAGE  
TAKE ONE TABLET BY MOUTH TWICE A DAY WITH MEALS  
  
 sertraline 50 mg tablet Commonly known as:  ZOLOFT  
TAKE ONE (1) TABLET(S) ONCE DAILY  
  
 simvastatin 10 mg tablet Commonly known as:  ZOCOR  
TAKE ONE TABLET BY MOUTH EVERY DAY IN THE EVENING We Performed the Following CBC W/O DIFF [69896 CPT(R)] METABOLIC PANEL, COMPREHENSIVE [53034 CPT(R)] PROTHROMBIN TIME + INR [44139 CPT(R)] UPPER GI ENDOSCOPY,DIAGNOSIS [32983 CPT(R)] Comments:  
 Shahrzad Cruz 2017 Introducing \[facility] [place] [facility]\"" & HEALTH SERVICES! Idalmis Jefferson introduces CrossReader patient portal. Now you can access parts of your medical record, email your doctor's office, and request medication refills online. 1. In your internet browser, go to [email or web address] 2. Click on the First Time User? Click Here link in the Sign In box. You will see the New Member Sign Up page. 3. Enter your CrossReader Access Code exactly as it appears below. You will not need to use this code after youve completed the sign-up process. If you do not sign up before the expiration date, you must request a new code. · CrossReader Access Code: [email or web address] Expires: 9/17/2017 11:28 AM 
 
4. Enter the last four digits of your Social Security Number (xxxx) and Date of Birth (mm/dd/yyyy) as indicated and click Submit. You will be taken to the next sign-up page. 5. Create a Passworks ID. This will be your Passworks login ID and cannot be changed, so think of one that is secure and easy to remember. 6. Create a Passworks password. You can change your password at any time. 7. Enter your Password Reset Question and Answer. This can be used at a later time if you forget your password. 8. Enter your e-mail address. You will receive e-mail notification when new information is available in [address] 9. Click Sign Up. You can now view and download portions of your medical record. 10. Click the Download Summary menu link to download a portable copy of your medical information. If you have questions, please visit the Frequently Asked Questions section of the Passworks website. Remember, Passworks is NOT to be used for urgent needs. For medical emergencies, dial 911. Now available from your iPhone and Android! Please provide this summary of care documentation to your next provider. Your primary care clinician is listed as Coleman Sterling. If you have any questions after today's visit, please call [phone].

## 2017-06-20 LAB
ALBUMIN SERPL-MCNC: 4.5 G/DL (ref 3.5–4.8)
ALBUMIN/GLOB SERPL: 2 {RATIO} (ref 1.2–2.2)
ALP SERPL-CCNC: 87 IU/L (ref 39–117)
ALT SERPL-CCNC: 22 IU/L (ref 0–32)
AST SERPL-CCNC: 37 IU/L (ref 0–40)
BILIRUB SERPL-MCNC: 1 MG/DL (ref 0–1.2)
BUN SERPL-MCNC: 16 MG/DL (ref 8–27)
BUN/CREAT SERPL: 15 (ref 12–28)
CALCIUM SERPL-MCNC: 9.3 MG/DL (ref 8.7–10.3)
CHLORIDE SERPL-SCNC: 105 MMOL/L (ref 96–106)
CO2 SERPL-SCNC: 22 MMOL/L (ref 18–29)
CREAT SERPL-MCNC: 1.09 MG/DL (ref 0.57–1)
ERYTHROCYTE [DISTWIDTH] IN BLOOD BY AUTOMATED COUNT: 14.5 % (ref 12.3–15.4)
GLOBULIN SER CALC-MCNC: 2.2 G/DL (ref 1.5–4.5)
GLUCOSE SERPL-MCNC: 97 MG/DL (ref 65–99)
HCT VFR BLD AUTO: 36.5 % (ref 34–46.6)
HGB BLD-MCNC: 12 G/DL (ref 11.1–15.9)
INR PPP: 1.2 (ref 0.8–1.2)
MCH RBC QN AUTO: 29.9 PG (ref 26.6–33)
MCHC RBC AUTO-ENTMCNC: 32.9 G/DL (ref 31.5–35.7)
MCV RBC AUTO: 91 FL (ref 79–97)
MORPHOLOGY BLD-IMP: ABNORMAL
PLATELET # BLD AUTO: 84 X10E3/UL (ref 150–379)
POTASSIUM SERPL-SCNC: 5.1 MMOL/L (ref 3.5–5.2)
PROT SERPL-MCNC: 6.7 G/DL (ref 6–8.5)
PROTHROMBIN TIME: 11.9 SEC (ref 9.1–12)
RBC # BLD AUTO: 4.02 X10E6/UL (ref 3.77–5.28)
SODIUM SERPL-SCNC: 145 MMOL/L (ref 134–144)
WBC # BLD AUTO: 4.5 X10E3/UL (ref 3.4–10.8)

## 2017-06-20 NOTE — PROGRESS NOTES
93 Alan Burgos MD, KATY Mcleod PA-C Alissa Rama, NP        at [address] [facility] [address]     [address] [place] [address], [address] Tal Ramirez  22.     [phone]     FAX: [phone]    at [address], [address] [place] [address], [address]     [phone]     FAX: [phone]     Patient Care Team:  Elham Carranza MD as PCP - General (Family Practice)    Patient Active Problem List   Diagnosis Code    Thrombocytopenia ([facility] D69.6    Type II diabetes mellitus ([facility] E11.9    Hypertension I10    Hypercholesterolemia E78.00    Cirrhosis, cryptogenic ([facility] K74.69    Secondary esophageal varices without bleeding ([facility] I85.10       Michael Giang [facility] to the [facility] regarding management of cryptogenic cirrhosis. The active problem list, all pertinent past medical history, medications, radiologic findings and laboratory findings related to the liver disorder were reviewed with the patient. The patient is a 68 y.o.  female who was first found to have chronic liver disease and cirrhosis in 11/2016 after she was noted to have thrombocytopenia. An assessment of liver fibrosis with biopsy or elastography has been performed and was consistent with cirrhosis. The patient has not developed any major complications of cirrhosis to date. The patient has not developed ascites. The patient has not developed hepatic encephalopathy. The most recent laboratory studies indicate that the liver transaminases are normal, ALP is normal, tests of hepatic synthetic and metabolic function are normal, and the platelet count is depressed. The patient has no symptoms which could be attributed to the liver disorder. The patient completes all daily activities without any functional limitations.  The patient has not experienced fatigue, pain in the right side over the liver, problems concentrating, swelling of the abdomen, swelling of the lower extremities, hematemesis, hematochezia. ALLERGIES  Allergies   Allergen Reactions    Percodan [Oxycodone Hcl-Oxycodone-Asa] Other (comments)     MEDICATIONS  Current Outpatient Prescriptions   Medication Sig    glipiZIDE SR (GLUCOTROL) 10 mg CR tablet TAKE ONE TABLET BY MOUTH EVERY DAY    lisinopril (PRINIVIL, ZESTRIL) 10 mg tablet TAKE ONE TABLET BY MOUTH EVERY DAY    metFORMIN (GLUCOPHAGE) 1,000 mg tablet TAKE ONE TABLET BY MOUTH TWICE A DAY WITH MEALS    sertraline (ZOLOFT) 50 mg tablet TAKE ONE (1) TABLET(S) ONCE DAILY    simvastatin (ZOCOR) 10 mg tablet TAKE ONE TABLET BY MOUTH EVERY DAY IN THE EVENING     No current facility-administered medications for this visit. SYSTEM REVIEW NOT RELATED TO LIVER DISEASE OR REVIEWED ABOVE:  Constitution systems: Negative for fever, chills, weight gain, weight loss. Eyes: Negative for visual changes. ENT: Negative for sore throat, painful swallowing. Respiratory: Negative for cough, hemoptysis, SOB. Cardiology: Negative for chest pain, palpitations. GI:  Negative for constipation or diarrhea. : Negative for urinary frequency, dysuria, hematuria, nocturia. Skin: Negative for rash. Hematology: Negative for easy bruising, blood clots. Musculo-skelatal: Negative for back pain, muscle pain, weakness. Neurologic: Negative for headaches, dizziness, vertigo, memory problems not related to HE. Psychology: Negative for anxiety, depression. FAMILY HISTORY:  The father  of cirrhosis probably from chronic HCV. The mother  of cancer. There is no family history of liver disease. SOCIAL HISTORY:  The patient is . The patient has 2 children, 4 stepchildren, and alot grandchildren. The patient has never used tobacco products. The patient has never consumed significant amounts of alcohol.     The patient used to work in grocery stores. The patient has not worked since 2009. PHYSICAL EXAMINATION:  Visit Vitals    /61    Pulse 75    Temp 97.9 °F (36.6 °C) (Oral)    Wt 122 lb (55.3 kg)    SpO2 95%    BMI 27.35 kg/m2     General: No acute distress. Eyes: Sclera anicteric. ENT: No oral lesions. Thyroid normal.  Nodes: No adenopathy. Skin: No spider angiomata. No jaundice. No palmar erythema. Respiratory: Lungs clear to auscultation. Cardiovascular: Regular heart rate. No murmurs. No JVD. Abdomen: Soft non-tender. Liver size normal to percussion/palpation. Spleen not palpable. No obvious ascites. Extremities: No edema. No muscle wasting. No gross arthritic changes. Neurologic: Alert and oriented. Cranial nerves grossly intact. No asterixis. LABORATORY STUDIES:  Liver [facility] Ref Rng & Units 6/19/2017 2/16/2017   WBC 3.4 - 10.8 x10E3/uL 4.5    ANC 1.4 - 7.0 x10E3/uL     HGB 11.1 - 15.9 g/dL 12.0     - 379 x10E3/uL 84 (LL)    PLT       - 379 X10E3/uL     INR 0.8 - 1.2 1.2    AST 0 - 40 IU/L 37 40   ALT 0 - 32 IU/L 22 31   Alk Phos 39 - 117 IU/L 87 73   Bili, Total 0.0 - 1.2 mg/dL 1.0 0.6   Bili, Direct 0.00 - 0.40 mg/dL     Albumin 3.5 - 4.8 g/dL 4.5 3.9   BUN 8 - 27 mg/dL 16 12   Creat 0.57 - 1.00 mg/dL 1.09 (H) 0.93   Na 134 - 144 mmol/L 145 (H) 146 (H)   K 3.5 - 5.2 mmol/L 5.1 5.0   Cl 96 - 106 mmol/L 105 106   CO2 18 - 29 mmol/L 22 22   Glucose 65 - 99 mg/dL 97 86     SEROLOGIES:  Serologies Latest Ref Rng 9/29/2016   Hep B Surface Ag Negative Negative   Hep B Core Ab, Total Negative Negative   Hep B Surface AB QL  Reactive   Hep C Ab 0.0 - 0.9 s/co ratio <0.1   Ferritin 15 - 150 ng/mL 55     LIVER HISTOLOGY:  2/16/2017. FibroScan performed at [address] EkPa was 21.1. Suggested fibrosis level is F4. ENDOSCOPIC PROCEDURES:  2/2017. EGD by MLS. A few small esophageal varices were identified. Banding of esophageal varices was performed.  Mild portal hypertensive gastropathy of the body of the stomach. No gastric varicies identified. RADIOLOGY:  10/2016. Ultrasound of liver. Echogenic consistent with cirrhosis. No liver mass lesions. No dilated bile ducts. No ascites. 11/2016. MRI scan abdomen. Changes consistent with cirrhosis. No liver mass lesions. No dilated bile ducts. No bile duct strictures. No ascites. 5/2017. Ultrasound of liver. Echogenic consistent with cirrhosis. No liver mass lesions. No dilated bile ducts. No ascites. OTHER TESTING:  Not available or performed    ASSESSMENT AND PLAN:  Cryptogenic cirrhosis. This is most likely due to MANUEL. Liver transaminases are normal. Alkaline phosphatase is normal. Liver function is normal. The platelet count is normal.      There is no reason to perform liver biopsy at this time. The patient has never developed any complications of cirrhosis to date. The CTP is 5. Child class A. The MELD score is 10. Esophageal varices. Recent EGD in February 2017 demonstrated small varices and were banded. She will be due for another EGD in August 2017. This will be scheduled. Hepatic encephalopathy has not developed to date. There is no need for treatment with lactulose and/or Xifaxan at this time. No need to restrict dietary protein at this time. The patient was directed to continue all current medications at the current dosages. There are no contraindications for the patient to take any medications that are necessary for treatment of other medical issues. The patient was counseled regarding alcohol consumption. Thrombocytopenia secondary to cirrhosis. There is no evidence of overt bleeding. There is no indication for platelet transfusions or pharmacologic treatment to increase the platelet count. Bone density to assess for osteoporosis has not been performed.       The need for vaccination against viral hepatitis A and B will be assessed with serologic and instituted as appropriate. [address] screening has recently been performed and does not suggest [address] The next liver imaging study will be performed in 11/2017. All of the above issues were discussed with the patient. All questions were answered. The patient expressed a clear understanding of the above. [address] [place] [address] in 6 months.     Randal Tsang NP  Liver [place] [address]  Ph: [phone]  Fax: [phone]  Email: [email or web address]

## 2017-06-27 ENCOUNTER — HOSPITAL ENCOUNTER (OUTPATIENT)
Age: 77
Setting detail: OUTPATIENT SURGERY
Discharge: HOME | End: 2017-06-27
Attending: INTERNAL MEDICINE | Admitting: INTERNAL MEDICINE
Payer: MEDICARE

## 2017-06-27 VITALS
SYSTOLIC BLOOD PRESSURE: 113 MMHG | HEART RATE: 68 BPM | RESPIRATION RATE: 13 BRPM | OXYGEN SATURATION: 95 % | DIASTOLIC BLOOD PRESSURE: 60 MMHG

## 2017-06-27 PROCEDURE — 77030014243 HC BND LIG VRCES BSC -D: Performed by: INTERNAL MEDICINE

## 2017-06-27 PROCEDURE — 74011250636 HC RX REV CODE- 250/636: Performed by: INTERNAL MEDICINE

## 2017-06-27 PROCEDURE — 76040000019: Performed by: INTERNAL MEDICINE

## 2017-06-27 RX ORDER — SODIUM CHLORIDE 0.9 % (FLUSH) 0.9 %
5-10 SYRINGE (ML) INJECTION EVERY 8 HOURS
Status: DISCONTINUED | OUTPATIENT
Start: 2017-06-27 | End: 2017-06-27 | Stop reason: HOSPADM

## 2017-06-27 RX ORDER — NALOXONE HYDROCHLORIDE 0.4 MG/ML
0.4 INJECTION, SOLUTION INTRAMUSCULAR; INTRAVENOUS; SUBCUTANEOUS
Status: DISCONTINUED | OUTPATIENT
Start: 2017-06-27 | End: 2017-06-27 | Stop reason: HOSPADM

## 2017-06-27 RX ORDER — EPINEPHRINE 0.1 MG/ML
1 INJECTION INTRACARDIAC; INTRAVENOUS
Status: DISCONTINUED | OUTPATIENT
Start: 2017-06-27 | End: 2017-06-27 | Stop reason: HOSPADM

## 2017-06-27 RX ORDER — MIDAZOLAM HYDROCHLORIDE 1 MG/ML
5-10 INJECTION, SOLUTION INTRAMUSCULAR; INTRAVENOUS
Status: DISCONTINUED | OUTPATIENT
Start: 2017-06-27 | End: 2017-06-27 | Stop reason: HOSPADM

## 2017-06-27 RX ORDER — DEXTROMETHORPHAN/PSEUDOEPHED 2.5-7.5/.8
1.2 DROPS ORAL
Status: DISCONTINUED | OUTPATIENT
Start: 2017-06-27 | End: 2017-06-27 | Stop reason: HOSPADM

## 2017-06-27 RX ORDER — ATROPINE SULFATE 0.1 MG/ML
0.5 INJECTION INTRAVENOUS
Status: DISCONTINUED | OUTPATIENT
Start: 2017-06-27 | End: 2017-06-27 | Stop reason: HOSPADM

## 2017-06-27 RX ORDER — FENTANYL CITRATE 50 UG/ML
50-200 INJECTION, SOLUTION INTRAMUSCULAR; INTRAVENOUS
Status: DISCONTINUED | OUTPATIENT
Start: 2017-06-27 | End: 2017-06-27 | Stop reason: HOSPADM

## 2017-06-27 RX ORDER — SODIUM CHLORIDE 0.9 % (FLUSH) 0.9 %
5-10 SYRINGE (ML) INJECTION AS NEEDED
Status: DISCONTINUED | OUTPATIENT
Start: 2017-06-27 | End: 2017-06-27 | Stop reason: HOSPADM

## 2017-06-27 RX ORDER — FLUMAZENIL 0.1 MG/ML
0.2 INJECTION INTRAVENOUS
Status: DISCONTINUED | OUTPATIENT
Start: 2017-06-27 | End: 2017-06-27 | Stop reason: HOSPADM

## 2017-06-27 RX ORDER — SODIUM CHLORIDE 9 MG/ML
50 INJECTION, SOLUTION INTRAVENOUS CONTINUOUS
Status: DISCONTINUED | OUTPATIENT
Start: 2017-06-27 | End: 2017-06-27 | Stop reason: HOSPADM

## 2017-06-27 NOTE — PROCEDURES
93 Alan Burgos MD, KATY Friedman PA-C Alverta Dress, NP        at [address], [address] Tal Ramirez  22.     [phone]     FAX: [phone]    at [address], [address] [place] [address], [address]     [phone]     FAX: [phone]       UPPER ENDOSCOPY PROCEDURE NOTE    Home Prudent  1940    INDICATION: Cirrhosis. Screening for esophageal varices with variceal ligation if indicated. : Farrel Nageotte, MD    ANESTHESIA/SEDATION: Versed 5 mg and Fentanyl 100 mcg      PROCEDURE DESCRIPTION:  [employer] consent was obtained from the patient for the procedure. All risks and benefits of the procedure explained. The patient was taken to the endoscopy suite and placed in the left lateral decubitus position. Following sequential administration of sedation to doses as indicated above the endoscope was inserted into the mouth and advanced under direct vision to the second portion of the duodenum. Careful inspection of upper gastrointestinal tract was made as the endoscope was inserted and withdrawn. Retroflexion of the endoscope to view of the cardia of the stomach was performed. After withdrawing the endoscope the banding devise was placed on the tip of the endoscope. The scope was then reinserted under direct inspection and advanced to the esophagus. Banding of esophageal varices was performed as described below. The scope was then removed. FINDINGS:  Esophagus:    A few medium esophageal varices were identified. Banding of esophageal varices was performed. Excellent hemostasis was achieved after banding. Stomach: Moderate portal hypertensive gastropathy of the antrum stomach. No gastric varicies identified.     Duodenum:   Normal bulb and second portion    INTERVENTION:   3 bands placed were placed on esophageal varices. COMPLICATIONS: None. The patient tolerated the procedure well. EBL: Negligible. RECOMMENDATIONS:  Observe until discharge parameters are achieved. Liquid diet today. Soft food tomorrow. Resume general diet thereafter. Repeat endoscopy to reassess varices and need for additional banding in 6 months. Follow-up Liver [facility] office as scheduled.       Emile Lewis MD  6/27/2017  1:49 PM

## 2017-06-27 NOTE — DISCHARGE INSTRUCTIONS
93 Alan Burgos MD, KATY Proctor PA-C Rock Barr, NP        at [address], [address] Tal Ramirez  22.     [phone]     FAX: [phone]    at [address], [address] [facility], [address]     [phone]     FAX: [phone]       ENDOSCOPY WITH BANDING DISCHARGE INSTRUCTIONS    Danial Mulugeta  1940  Date: 6/27/2017    DISCOMFORT:  Use lozenges or warm salt water gargle for sore thoat  Apply warm compress to IV site if red. If redness or soreness persists call the office. You may experience gas and bloating. Walking and belching will help relieve this. You may experience chest pain or discomfort or feel as though food is \"sticking\" in your food pipe for a few days after the procedure. This is a normal feeling after banding of esophageal varices. DIET:  Regular food may dislodge the bands placed on the varices. For this reason you should only have liquid for the rest of today. Eat only soft food that does not need to be chewed all day tomorrow. You may advance to your regular diet in 2 days. ACTIVITY:  Spend the remainder of the day resting. Avoid any strenuous activity. You may not operate a vehicle for 12 hours. You may not engage in an occupation involving machinery or appliances for rest of today. Avoid making any critical decisions for at least 24 hour. Call the [facility] [employer] [address] if you have any of the following:  Increasing chest or abdominal pain, nausea, vomiting, vomiting blood, abdominal distension or swelling, fever or chills, bloody discharge from nose or mouth or shortness of breath. Follow-up Instructions:  Call Dr. Ivana Garcia for any questions or problems at the phone number listed above.   If a biopsy was performed, you will be contacted by the office staff or  Gisele within 1 week. If you have not heard from us by then you may call the office at the phone number listed above to inquire about the results. ENDOSCOPY FINDINGS:  A few varices were found in the esophagus (food tube). 3 bands were placed to seal the varices and reduce the risk of bleeding. DISCHARGE SUMMARY from the Nurse:   The following personal items collected during your admission are returned to you:   Dental Appliance:    Vision:    Hearing Aid:    Jewelry:    Clothing:    Other Valuables:    Valuables sent to safe:

## 2017-06-27 NOTE — IP AVS SNAPSHOT
[address] 
[phone] Patient: Jude Hardy MRN: [id number] PFB:11[birth year] You are allergic to the following Allergen Reactions Percodan (Oxycodone Hcl-Oxycodone-Asa) Other (comments) Recent Documentation OB Status Smoking Status Menopause Former Smoker Emergency Contacts Name Discharge Info Relation Home Work Mobile Javier Bardales DISCHARGE CAREGIVER [3] Spouse [3] [phone] About your hospitalization You were admitted on:  June 27, 2017 You last received care in the:  [facility] ENDOSCOPY You were discharged on:  June 27, 2017 Unit phone number:  [phone] Why you were hospitalized Your primary diagnosis was:  Not on File Providers Seen During Your Hospitalizations Provider Role Specialty Primary office phone Linda Brown MD Attending Provider Hepatology [phone] Your Primary Care Physician (PCP) Primary Care Physician Office Phone Office Fax Shashi Bonilla [phone] Follow-up Information Follow up With Details Comments Contact Info Juan Gray MD   [address] [address] 
[phone] Current Discharge Medication List  
  
CONTINUE these medications which have NOT CHANGED Dose & Instructions Dispensing Information Comments Morning Noon Evening Bedtime  
 glipiZIDE SR 10 mg CR tablet Commonly known as:  GLUCOTROL XL Your last dose was: Your next dose is: TAKE ONE TABLET BY MOUTH EVERY DAY Refills:  5  
     
   
   
   
  
 lisinopril 10 mg tablet Commonly known as:  Salvadore Dine Your last dose was: Your next dose is: TAKE ONE TABLET BY MOUTH EVERY DAY Refills:  5  
     
   
   
   
  
 metFORMIN 1,000 mg tablet Commonly known as:  GLUCOPHAGE Your last dose was: Your next dose is: TAKE ONE TABLET BY MOUTH TWICE A DAY WITH MEALS Refills:  5  
     
   
   
   
  
 sertraline 50 mg tablet Commonly known as:  ZOLOFT Your last dose was: Your next dose is: TAKE ONE (1) TABLET(S) ONCE DAILY Refills:  0  
     
   
   
   
  
 simvastatin 10 mg tablet Commonly known as:  ZOCOR Your last dose was: Your next dose is: TAKE ONE TABLET BY MOUTH EVERY DAY IN THE EVENING Refills:  5 Discharge Instructions [address] [place] [address] Drive Muna Resendiz MD, KATY Mcdermott, GIGI Munoz NP  
 
   at [facility] 
   [address] [facility], [address] Tal Ramirez  22. 
   [phone] FAX: [phone]    at [address], [address], [address] 
   [phone] FAX: [phone] ENDOSCOPY WITH BANDING DISCHARGE INSTRUCTIONS Chauncey Sotelo 1940 Date: 6/27/2017 DISCOMFORT: 
Use lozenges or warm salt water gargle for sore thoat Apply warm compress to IV site if red. If redness or soreness persists call the office. You may experience gas and bloating. Walking and belching will help relieve this. You may experience chest pain or discomfort or feel as though food is \"sticking\" in your food pipe for a few days after the procedure. This is a normal feeling after banding of esophageal varices. DIET: 
Regular food may dislodge the bands placed on the varices. For this reason you should only have liquid for the rest of today. Eat only soft food that does not need to be chewed all day tomorrow. You may advance to your regular diet in 2 days. ACTIVITY: 
Spend the remainder of the day resting. Avoid any strenuous activity. You may not operate a vehicle for 12 hours. You may not engage in an occupation involving machinery or appliances for rest of today. Avoid making any critical decisions for at least 24 hour. Call the [facility] if you have any of the following: 
Increasing chest or abdominal pain, nausea, vomiting, vomiting blood, abdominal distension or swelling, fever or chills, bloody discharge from nose or mouth or shortness of breath. Follow-up Instructions: 
Call Dr. Ena Jeffries for any questions or problems at the phone number listed above. If a biopsy was performed, you will be contacted by the office staff or Dr Ena Jeffries within 1 week. If you have not heard from us by then you may call the office at the phone number listed above to inquire about the results. ENDOSCOPY FINDINGS: 
A few varices were found in the esophagus (food tube). 3 bands were placed to seal the varices and reduce the risk of bleeding. DISCHARGE SUMMARY from the Nurse: The following personal items collected during your admission are returned to you:  
Dental Appliance:   
Vision:   
Hearing Aid:   
Jewelry:   
Clothing:   
Other Valuables:   
Valuables sent to safe:   
 
 
Discharge Orders None Introducing \[facility] [place] [facility]\"" & HEALTH SERVICES! Edgard Harrington introduces "[employer] patient portal. Now you can access parts of your medical record, email your doctor's office, and request medication refills online. 1. In your internet browser, go to [email or web address] 2. Click on the First Time User? Click Here link in the Sign In box. You will see the New Member Sign Up page. 3. Enter your "[employer] Access Code exactly as it appears below. You will not need to use this code after youve completed the sign-up process. If you do not sign up before the expiration date, you must request a new code. · "[employer] Access Code: [email or web address] Expires: 9/17/2017 11:28 AM 
 
 4. Enter the last four digits of your Social Security Number (xxxx) and Date of Birth (mm/dd/yyyy) as indicated and click Submit. You will be taken to the next sign-up page. 5. Create a [employer] ID. This will be your [employer] login ID and cannot be changed, so think of one that is secure and easy to remember. 6. Create a [employer] password. You can change your password at any time. 7. Enter your Password Reset Question and Answer. This can be used at a later time if you forget your password. 8. Enter your e-mail address. You will receive e-mail notification when new information is available in [address] 9. Click Sign Up. You can now view and download portions of your medical record. 10. Click the Download Summary menu link to download a portable copy of your medical information. If you have questions, please visit the Frequently Asked Questions section of the [employer] website. Remember, [employer] is NOT to be used for urgent needs. For medical emergencies, dial 911. Now available from your iPhone and Android! General Information Please provide this summary of care documentation to your next provider. Patient Signature:  ____________________________________________________________ Date:  ____________________________________________________________  
  
Neita Hidden Provider Signature:  ____________________________________________________________ Date:  ____________________________________________________________

## 2017-06-27 NOTE — H&P
93 Alan Burgos MD, KATY Alexandre PA-C Arland Spires, NP        at [address]     [address] [place] [address], [address] Tal Ramirez  22.     [phone]     FAX: [phone]    at [address], [address] [place] [address], [address]     [phone]     FAX: [phone]       PRE-PROCEDURE NOTE - EGD    H and P from last office visit reviewed. Allergies reviewed. Out-patient medicaton list reviewed. Patient Active Problem List   Diagnosis Code    Thrombocytopenia ([facility] D69.6    Type II diabetes mellitus ([facility] E11.9    Hypertension I10    Hypercholesterolemia E78.00    [facility], [facility] K74[facility]    Secondary esophageal varices without bleeding (HCC) I85.10       Allergies   Allergen Reactions    Percodan [Oxycodone Hcl-Oxycodone-Asa] Other (comments)       No current facility-administered medications on file prior to encounter. Current Outpatient Prescriptions on File Prior to Encounter   Medication Sig Dispense Refill    glipiZIDE SR (GLUCOTROL) 10 mg CR tablet TAKE ONE TABLET BY MOUTH EVERY DAY  5    lisinopril (PRINIVIL, ZESTRIL) 10 mg tablet TAKE ONE TABLET BY MOUTH EVERY DAY  5    metFORMIN (GLUCOPHAGE) 1,000 mg tablet TAKE ONE TABLET BY MOUTH TWICE A DAY WITH MEALS  5    sertraline (ZOLOFT) 50 mg tablet TAKE ONE (1) TABLET(S) ONCE DAILY  0    simvastatin (ZOCOR) 10 mg tablet TAKE ONE TABLET BY MOUTH EVERY DAY IN THE EVENING  5       For EGD to assess for esophageal and gastric varices. Plan to perform banding if indicated based upon variceal size and appearance. The risks of the procedure were discussed with the patient. These included reaction to anesthesia, pain, perforation and bleeding. All questions were answered. The patient wishes to proceed with the procedure.     PHYSICAL EXAMINATION:  VS: per nursing note  General: No acute distress. Eyes: Sclera anicteric. ENT: No oral lesions. Thyroid normal.  Nodes: No adenopathy. Skin: No spider angiomata. No jaundice. No palmar erythema. Respiratory: Lungs clear to auscultation. Cardiovascular: Regular heart rate. No murmurs. No JVD. Abdomen: Soft non-tender, liver size normal to percussion/palpation. Spleen not palpable. No obvious ascites. Extremities: No edema. No muscle wasting. No gross arthritic changes. Neurologic: Alert and oriented. Cranial nerves grossly intact. No asterixis. MOST RECENT LABORATORY STUDIES:  Lab Results   Component Value Date/Time    WBC 4.5 06/19/2017 11:27 AM    HGB 12.0 06/19/2017 11:27 AM    HCT 36.5 06/19/2017 11:27 AM    PLATELET 84 57/06/4090 11:27 AM    MCV 91 06/19/2017 11:27 AM     Lab Results   Component Value Date/Time    INR 1.2 06/19/2017 11:27 AM    INR 1.1 12/19/2016 03:25 PM    Prothrombin time 11.9 06/19/2017 11:27 AM    Prothrombin time 11.6 12/19/2016 03:25 PM       ASSESSMENT AND PLAN:  EGD to assess for esophageal and/or gastric varices. Conscious sedation with fentanyl and versed.     Daysi Sexton MD  Liver [facility] [address] [place] [address] [address].  [phone]

## 2017-06-27 NOTE — PERIOP NOTES
Banding, 3 varices, Superview Speedband, exp : 04/26/2018  REF: [id number]  LOT [id number]    Pt tolerated procedure well    . Endoscope was pre-cleaned at bedside immediately following procedure by NUBIA Mancini.

## 2017-06-27 NOTE — ROUTINE PROCESS
Chauncey Sotelo  1940  [id number]    Situation:  Verbal report received from: BRAIN Sanz  Procedure: Procedure(s):  ESOPHAGOGASTRODUODENOSCOPY (EGD)  ENDOSCOPIC BANDING OR LIGATION    Background:    Preoperative diagnosis: CIRRHOSIS, CRYPTOGENIC  Postoperative diagnosis: Pprtal gastrapathy, varices    :  Dr. Ena Jeffries  Assistant(s): Endoscopy Technician-1: Yesy Mancini  Endoscopy RN-1: Emory Anderson RN    Specimens: * No specimens in log *  H. Pylori  no    Assessment:  Intra-procedure medications   Versed  5 mg  Fentanyl  yes 100 mcg      Anesthesia gave intra-procedure sedation and medications, see anesthesia flow sheet yes and n/a    Intravenous fluids:      NS   300  @ KVO     Vital signs stable yes    Abdominal assessment: round and soft  yes    Recommendation:  Discharge patient per MD order yes.   Return to floor no  Family or Friend :   Permission to share finding with family or friend yes

## 2017-12-19 ENCOUNTER — OFFICE VISIT (OUTPATIENT)
Dept: HEMATOLOGY | Age: 77
End: 2017-12-19

## 2017-12-19 VITALS
DIASTOLIC BLOOD PRESSURE: 57 MMHG | HEART RATE: 95 BPM | BODY MASS INDEX: 26.46 KG/M2 | OXYGEN SATURATION: 99 % | TEMPERATURE: 98.6 F | WEIGHT: 118 LBS | SYSTOLIC BLOOD PRESSURE: 137 MMHG

## 2017-12-19 DIAGNOSIS — K76.6 PORTAL HYPERTENSION (HCC): ICD-10-CM

## 2017-12-19 DIAGNOSIS — K74.69 CIRRHOSIS, CRYPTOGENIC (HCC): ICD-10-CM

## 2017-12-19 DIAGNOSIS — I85.10 SECONDARY ESOPHAGEAL VARICES WITHOUT BLEEDING (HCC): ICD-10-CM

## 2017-12-19 DIAGNOSIS — I10 ESSENTIAL HYPERTENSION: ICD-10-CM

## 2017-12-19 DIAGNOSIS — D69.6 THROMBOCYTOPENIA (HCC): Primary | ICD-10-CM

## 2017-12-19 PROBLEM — E11.21 TYPE 2 DIABETES MELLITUS WITH NEPHROPATHY (HCC): Status: ACTIVE | Noted: 2017-12-19

## 2017-12-19 RX ORDER — GLIPIZIDE 5 MG/1
TABLET, FILM COATED, EXTENDED RELEASE ORAL
COMMUNITY
Start: 2017-11-14 | End: 2019-11-22

## 2017-12-19 RX ORDER — NITROFURANTOIN 25; 75 MG/1; MG/1
CAPSULE ORAL
COMMUNITY
Start: 2017-11-15 | End: 2019-11-22

## 2017-12-19 NOTE — PROGRESS NOTES
[address] Aguilar MD, Asya Vicente, [place] [place] [place], [place]       Adeola Gandhi, KATY Sidhu, GIGI Patel, [facility]   KATY Saleem NP        at [address], [address] [address]     [phone]     FAX: [phone]    at [facility], [address] [place] [address], [address]     [phone]     FAX: [phone]     Patient Care Team:  Estella Davila MD as PCP - General (Family Practice)    Patient Active Problem List   Diagnosis Code    Thrombocytopenia ([facility] D69.6    Type II diabetes mellitus ([address] E11.9    Hypertension I10    Hypercholesterolemia E78.00    Cirrhosis, cryptogenic ([address] K74.69    Secondary esophageal varices without bleeding ([address] I85.10    Type 2 diabetes mellitus with nephropathy ([address] E11.21       Ava Mckeon returns to the [facility] regarding management of cryptogenic cirrhosis. The active problem list, all pertinent past medical history, medications, radiologic findings and laboratory findings related to the liver disorder were reviewed with the patient. The patient is a 68 y.o.  female who was first found to have chronic liver disease and cirrhosis in 11/2016 after she was noted to have thrombocytopenia. An assessment of liver fibrosis with biopsy or elastography has been performed and was consistent with cirrhosis. Patient had another EGD in June 2017. This demonstrated medium varices which required therapeutic banding. There were no complications. The patient has not developed any major complications of cirrhosis to date. The patient has not developed ascites. The patient has not developed hepatic encephalopathy.     The most recent laboratory studies indicate that the liver transaminases are normal, ALP is normal, tests of hepatic synthetic and metabolic function are normal, and the platelet count is depressed. Patient remains active. The patient has no symptoms which could be attributed to the liver disorder. The patient completes all daily activities without any functional limitations. The patient has not experienced fatigue, pain in the right side over the liver, problems concentrating, swelling of the abdomen, swelling of the lower extremities, hematemesis, hematochezia. ALLERGIES  Allergies   Allergen Reactions    Percodan [Oxycodone Hcl-Oxycodone-Asa] Other (comments)     MEDICATIONS  Current Outpatient Prescriptions   Medication Sig    glipiZIDE SR (GLUCOTROL XL) 5 mg CR tablet     nitrofurantoin, macrocrystal-monohydrate, (MACROBID) 100 mg capsule     lisinopril (PRINIVIL, ZESTRIL) 10 mg tablet TAKE ONE TABLET BY MOUTH EVERY DAY    metFORMIN (GLUCOPHAGE) 1,000 mg tablet TAKE ONE TABLET BY MOUTH TWICE A DAY WITH MEALS    sertraline (ZOLOFT) 50 mg tablet TAKE ONE (1) TABLET(S) ONCE DAILY    simvastatin (ZOCOR) 10 mg tablet TAKE ONE TABLET BY MOUTH EVERY DAY IN THE EVENING     No current facility-administered medications for this visit. SYSTEM REVIEW NOT RELATED TO LIVER DISEASE OR REVIEWED ABOVE:  Constitution systems: Negative for fever, chills, weight gain, weight loss. Eyes: Negative for visual changes. ENT: Negative for sore throat, painful swallowing. Respiratory: Negative for cough, hemoptysis, SOB. Cardiology: Negative for chest pain, palpitations. GI:  Negative for constipation or diarrhea. : Negative for urinary frequency, dysuria, hematuria, nocturia. Skin: Negative for rash. Hematology: Negative for easy bruising, blood clots. Musculo-skelatal: Negative for back pain, muscle pain, weakness. Neurologic: Negative for headaches, dizziness, vertigo, memory problems not related to HE. Psychology: Negative for anxiety, depression. FAMILY HISTORY:  The father  of cirrhosis probably from chronic HCV.     The mother  of cancer. There is no family history of liver disease. SOCIAL HISTORY:  The patient is . The patient has 2 children, 4 stepchildren, and alot grandchildren. The patient has never used tobacco products. The patient has never consumed significant amounts of alcohol. The patient used to work in [employer]. The patient has not worked since . PHYSICAL EXAMINATION:  Visit Vitals    /57    Pulse 95    Temp 98.6 °F (37 °C) (Tympanic)    Wt 118 lb (53.5 kg)    SpO2 99%    BMI 26.46 kg/m2     General: No acute distress. Eyes: Sclera anicteric. ENT: No oral lesions. Thyroid normal.  Nodes: No adenopathy. Skin: No spider angiomata. No jaundice. No palmar erythema. Respiratory: Lungs clear to auscultation. Cardiovascular: Regular heart rate. No murmurs. No JVD. Abdomen: Soft non-tender. Liver size normal to percussion/palpation. Spleen not palpable. No obvious ascites. Extremities: No edema. No muscle wasting. No gross arthritic changes. Neurologic: Alert and oriented. Cranial nerves grossly intact. No asterixis.     LABORATORY STUDIES:  Liver [place] of [address] Units 2017   WBC 3.4 - 10.8 x10E3/uL 5.2 4.5   ANC 1.4 - 7.0 x10E3/uL     HGB 11.1 - 15.9 g/dL 12.0 12.0    - 379 x10E3/uL 74 (LL) 84 (LL)   PLT       - 379 X10E3/uL     INR 0.8 - 1.2  1.2   AST 0 - 40 IU/L 41 (H) 37   ALT 0 - 32 IU/L 28 22   Alk Phos 39 - 117 IU/L 73 87   Bili, Total 0.0 - 1.2 mg/dL 0.7 1.0   Bili, Direct 0.00 - 0.40 mg/dL     Albumin 3.5 - 4.8 g/dL 4.2 4.5   BUN 8 - 27 mg/dL 14 16   Creat 0.57 - 1.00 mg/dL 0.88 1.09 (H)   Na 134 - 144 mmol/L 145 (H) 145 (H)   K 3.5 - 5.2 mmol/L 4.9 5.1   Cl 96 - 106 mmol/L 109 (H) 105   CO2 18 - 29 mmol/L 19 22   Glucose 65 - 99 mg/dL 48 (L) 97     SEROLOGIES:  Serologies Latest Ref Rng 2016   Hep B Surface Ag Negative Negative   Hep B Core Ab, Total Negative Negative   Hep B Surface AB QL  Reactive   Hep C Ab 0.0 - 0.9 s/co ratio <0.1   Ferritin 15 - 150 ng/mL 55     LIVER HISTOLOGY:  2/16/2017. FibroScan performed at [facility]. EkPa was 21.1. Suggested fibrosis level is F4. ENDOSCOPIC PROCEDURES:  2/2017. EGD by MLS. A few small esophageal varices were identified. Banding of esophageal varices was performed. Mild portal hypertensive gastropathy of the body of the stomach. No gastric varicies identified. 6/2017. EGD by MLS. A few medium esophageal varices were identified. Banding (3) of esophageal varices was performed. Moderate portal hypertensive gastropathy of the antrum stomach. No gastric varicies identified.     RADIOLOGY:  10/2016. Ultrasound of liver. Echogenic consistent with cirrhosis. No liver mass lesions. No dilated bile ducts. No ascites. 11/2016. MRI scan abdomen. Changes consistent with cirrhosis. No liver mass lesions. No dilated bile ducts. No bile duct strictures. No ascites. 5/2017. Ultrasound of liver. Echogenic consistent with cirrhosis. No liver mass lesions. No dilated bile ducts. No ascites. OTHER TESTING:  Not available or performed    ASSESSMENT AND PLAN:  Cryptogenic cirrhosis. This is most likely due to MANUEL. Liver transaminases are normal. Alkaline phosphatase is normal. Liver function is normal. The platelet count is depressed. There is no reason to perform liver biopsy at this time. The patient has never developed any complications of cirrhosis to date. The CTP is 5. Child class A. The MELD score is 10. Esophageal varices. Recent EGD in June 2017 demonstrated medium varices which required banding. She is due for another EGD to reassess these varices. This will be scheduled. Hepatic encephalopathy has not developed to date. There is no need for treatment with lactulose and/or Xifaxan at this time. No need to restrict dietary protein at this time. [address] screening. Last imaging ruled out [address] in May 2017.  Next imaging is due. This was ordered today. Discussed with patient the importance of getting this done. The patient was directed to continue all current medications at the current dosages. There are no contraindications for the patient to take any medications that are necessary for treatment of other medical issues. The patient was counseled regarding alcohol consumption. She does not consume alcohol. Thrombocytopenia secondary to cirrhosis. There is no evidence of overt bleeding. There is no indication for platelet transfusions or pharmacologic treatment to increase the platelet count. Bone density to assess for osteoporosis has not been performed. The need for vaccination against viral hepatitis A and B will be assessed with serologic and instituted as appropriate. All of the above issues were discussed with the patient. All questions were answered. The patient expressed a clear understanding of the above. [address] [place] [address] in 6 months.     Dinesh Guerra NP  Liver [place] [address]  Ph: [phone]  Fax: [phone]  Email: [email or web address]

## 2017-12-19 NOTE — PROGRESS NOTES
1. Have you been to the ER, urgent care clinic since your last visit? Hospitalized since your last visit? No    2. Have you seen or consulted any other health care providers outside of the [address] [place] [address] since your last visit? Include any pap smears or colon screening. No   Chief Complaint   Patient presents with    Follow-up     PHQ over the last two weeks 12/19/2017   Little interest or pleasure in doing things Not at all   Feeling down, depressed or hopeless Not at all   Total Score PHQ 2 0     Fall Risk Assessment, last 12 mths 12/19/2017   Able to walk? Yes   Fall in past 12 months?  No     Learning Assessment 12/19/2017   PRIMARY LEARNER Patient   HIGHEST LEVEL OF EDUCATION - PRIMARY LEARNER  -   BARRIERS PRIMARY LEARNER -   [address]  CAREGIVER -   CO-LEARNER NAME -   PRIMARY LANGUAGE ENGLISH    NEED -   LEARNER PREFERENCE PRIMARY LISTENING   ANSWERED BY patient    RELATIONSHIP SELF     Visit Vitals    /57    Pulse 95    Temp 98.6 °F (37 °C) (Tympanic)    Wt 118 lb (53.5 kg)    SpO2 99%    BMI 26.46 kg/m2

## 2017-12-19 NOTE — MR AVS SNAPSHOT
Visit Information Date & Time Provider Department Dept. Phone Encounter #  
 12/19/2017  8:30 AM Milena Collazo, [address] of [facility] 219 [id number] Follow-up Instructions Return in about 6 months (around 6/19/2018). Your Appointments 6/19/2018  9:00 AM  
Follow Up with Milena Adrian NP [address] ([facility]) Appt Note: 6  MONTH FOLLOW UP  
 [facility] [phone] [facility] [address]  
[address] Upcoming Health Maintenance Date Due HEMOGLOBIN A1C Q6M 1940 LIPID PANEL Q1 1940 FOOT EXAM Q1 11/12/1950 MICROALBUMIN Q1 11/12/1950 EYE EXAM RETINAL OR DILATED Q1 11/12/1950 DTaP/Tdap/Td series (1 - Tdap) 11/12/1961 ZOSTER VACCINE AGE 60> 9/12/2000 GLAUCOMA SCREENING Q2Y 11/12/2005 OSTEOPOROSIS SCREENING (DEXA) 11/12/2005 Pneumococcal 65+ Low/Medium Risk (1 of 2 - PCV13) 11/12/2005 MEDICARE YEARLY EXAM 11/12/2005 Influenza Age 5 to Adult 8/1/2017 Allergies as of 12/19/2017  Review Complete On: 12/19/2017 By: Milena Adrian NP Severity Noted Reaction Type Reactions Percodan [Oxycodone Hcl-oxycodone-asa]  09/29/2016    Other (comments) Current Immunizations  Reviewed on 10/25/2016 No immunizations on file. Not reviewed this visit You Were Diagnosed With   
  
 Codes Comments Thrombocytopenia ([facility]    -  Primary ICD-10-CM: D69.6 ICD-9-CM: 287.5 Cirrhosis, cryptogenic ([facility]     ICD-10-CM: C86.68 ICD-9-CM: 571.5 Secondary esophageal varices without bleeding (HCC)     ICD-10-CM: I85.10 ICD-9-CM: 456.21 Essential hypertension     ICD-10-CM: I10 
ICD-9-CM: 401.9 Portal hypertension (HCC)     ICD-10-CM: K76.6 ICD-9-CM: 572.3 Vitals BP Pulse Temp Weight(growth percentile) SpO2 BMI  
 137/57 95 98.6 °F (37 °C) (Tympanic) 118 lb (53.5 kg) 99% 26.46 kg/m2 OB Status Smoking Status Menopause Former Smoker BMI and BSA Data Body Mass Index Body Surface Area  
 26.46 kg/m 2 1.45 m 2 Your Updated Medication List  
  
   
This list is accurate as of: 12/19/17  8:34 AM.  Always use your most recent med list.  
  
  
  
  
 glipiZIDE SR 5 mg CR tablet Commonly known as:  GLUCOTROL XL  
  
 lisinopril 10 mg tablet Commonly known as:  PRINIVIL, ZESTRIL  
TAKE ONE TABLET BY MOUTH EVERY DAY  
  
 metFORMIN 1,000 mg tablet Commonly known as:  GLUCOPHAGE  
TAKE ONE TABLET BY MOUTH TWICE A DAY WITH MEALS  
  
 nitrofurantoin (macrocrystal-monohydrate) 100 mg capsule Commonly known as:  MACROBID  
  
 sertraline 50 mg tablet Commonly known as:  ZOLOFT  
TAKE ONE (1) TABLET(S) ONCE DAILY  
  
 simvastatin 10 mg tablet Commonly known as:  ZOCOR  
TAKE ONE TABLET BY MOUTH EVERY DAY IN THE EVENING We Performed the Following CBC W/O DIFF [75419 CPT(R)] METABOLIC PANEL, COMPREHENSIVE [61023 CPT(R)] UPPER GI ENDOSCOPY,DIAGNOSIS [80313 CPT(R)] Follow-up Instructions Return in about 6 months (around 6/19/2018). To-Do List   
 12/19/2017 Imaging:  [employer] Introducing [facility]! Esther Pagan introduces [employer] patient portal. Now you can access parts of your medical record, email your doctor's office, and request medication refills online. 1. In your internet browser, go to [email or web address] 2. Click on the First Time User? Click Here link in the Sign In box. You will see the New Member Sign Up page. 3. Enter your [employer] Access Code exactly as it appears below. You will not need to use this code after youve completed the sign-up process. If you do not sign up before the expiration date, you must request a new code. · [employer] Access Code: [email or web address] Expires: 3/19/2018  8:34 AM 
 
 4. Enter the last four digits of your Social Security Number (xxxx) and Date of Birth (mm/dd/yyyy) as indicated and click Submit. You will be taken to the next sign-up page. 5. Create a [employer]pt[employer] ID. This will be your [employer] login ID and cannot be changed, so think of one that is secure and easy to remember. 6. Create a [employer] password. You can change your password at any time. 7. Enter your Password Reset Question and Answer. This can be used at a later time if you forget your password. 8. Enter your e-mail address. You will receive e-mail notification when new information is available in [address] 9. Click Sign Up. You can now view and download portions of your medical record. 10. Click the Download Summary menu link to download a portable copy of your medical information. If you have questions, please visit the Frequently Asked Questions section of the [employer] website. Remember, [employer] is NOT to be used for urgent needs. For medical emergencies, dial 911. Now available from your iPhone and Android! Please provide this summary of care documentation to your next provider. Your primary care clinician is listed as Sue Hernadez. If you have any questions after today's visit, please call [phone].

## 2017-12-20 LAB
ALBUMIN SERPL-MCNC: 4.2 G/DL (ref 3.5–4.8)
ALBUMIN/GLOB SERPL: 1.6 {RATIO} (ref 1.2–2.2)
ALP SERPL-CCNC: 73 IU/L (ref 39–117)
ALT SERPL-CCNC: 28 IU/L (ref 0–32)
AST SERPL-CCNC: 41 IU/L (ref 0–40)
BILIRUB SERPL-MCNC: 0.7 MG/DL (ref 0–1.2)
BUN SERPL-MCNC: 14 MG/DL (ref 8–27)
BUN/CREAT SERPL: 16 (ref 12–28)
CALCIUM SERPL-MCNC: 8.9 MG/DL (ref 8.7–10.3)
CHLORIDE SERPL-SCNC: 109 MMOL/L (ref 96–106)
CO2 SERPL-SCNC: 19 MMOL/L (ref 18–29)
CREAT SERPL-MCNC: 0.88 MG/DL (ref 0.57–1)
ERYTHROCYTE [DISTWIDTH] IN BLOOD BY AUTOMATED COUNT: 15.6 % (ref 12.3–15.4)
GLOBULIN SER CALC-MCNC: 2.6 G/DL (ref 1.5–4.5)
GLUCOSE SERPL-MCNC: 48 MG/DL (ref 65–99)
HCT VFR BLD AUTO: 36.4 % (ref 34–46.6)
HGB BLD-MCNC: 12 G/DL (ref 11.1–15.9)
MCH RBC QN AUTO: 30.3 PG (ref 26.6–33)
MCHC RBC AUTO-ENTMCNC: 33 G/DL (ref 31.5–35.7)
MCV RBC AUTO: 92 FL (ref 79–97)
MORPHOLOGY BLD-IMP: ABNORMAL
PLATELET # BLD AUTO: 74 X10E3/UL (ref 150–379)
POTASSIUM SERPL-SCNC: 4.9 MMOL/L (ref 3.5–5.2)
PROT SERPL-MCNC: 6.8 G/DL (ref 6–8.5)
RBC # BLD AUTO: 3.96 X10E6/UL (ref 3.77–5.28)
SODIUM SERPL-SCNC: 145 MMOL/L (ref 134–144)
WBC # BLD AUTO: 5.2 X10E3/UL (ref 3.4–10.8)

## 2017-12-27 PROBLEM — F33.9 RECURRENT DEPRESSION (HCC): Status: ACTIVE | Noted: 2017-12-27

## 2018-01-02 ENCOUNTER — HOSPITAL ENCOUNTER (OUTPATIENT)
Dept: ULTRASOUND IMAGING | Age: 78
Discharge: HOME | End: 2018-01-02
Attending: NURSE PRACTITIONER

## 2018-01-02 DIAGNOSIS — K74.69 CIRRHOSIS, CRYPTOGENIC (HCC): ICD-10-CM

## 2018-01-02 DIAGNOSIS — K76.6 PORTAL HYPERTENSION (HCC): ICD-10-CM

## 2018-01-02 DIAGNOSIS — D69.6 THROMBOCYTOPENIA (HCC): ICD-10-CM

## 2018-01-23 ENCOUNTER — HOSPITAL ENCOUNTER (OUTPATIENT)
Dept: ULTRASOUND IMAGING | Age: 78
Discharge: HOME | End: 2018-01-23
Attending: NURSE PRACTITIONER
Payer: MEDICARE

## 2018-01-23 PROCEDURE — 76705 ECHO EXAM OF ABDOMEN: CPT

## 2018-03-01 ENCOUNTER — ANESTHESIA (OUTPATIENT)
Dept: ENDOSCOPY | Age: 78
End: 2018-03-01
Payer: MEDICARE

## 2018-03-01 ENCOUNTER — HOSPITAL ENCOUNTER (OUTPATIENT)
Age: 78
Setting detail: OUTPATIENT SURGERY
Discharge: HOME | End: 2018-03-01
Attending: INTERNAL MEDICINE | Admitting: INTERNAL MEDICINE
Payer: MEDICARE

## 2018-03-01 ENCOUNTER — ANESTHESIA EVENT (OUTPATIENT)
Dept: ENDOSCOPY | Age: 78
End: 2018-03-01
Payer: MEDICARE

## 2018-03-01 VITALS
RESPIRATION RATE: 14 BRPM | HEIGHT: 56 IN | SYSTOLIC BLOOD PRESSURE: 80 MMHG | TEMPERATURE: 98.1 F | BODY MASS INDEX: 26.59 KG/M2 | DIASTOLIC BLOOD PRESSURE: 58 MMHG | OXYGEN SATURATION: 95 % | HEART RATE: 73 BPM | WEIGHT: 118.19 LBS

## 2018-03-01 PROCEDURE — 74011000250 HC RX REV CODE- 250

## 2018-03-01 PROCEDURE — 74011250636 HC RX REV CODE- 250/636

## 2018-03-01 PROCEDURE — 76040000007: Performed by: INTERNAL MEDICINE

## 2018-03-01 PROCEDURE — 77030009426 HC FCPS BIOP ENDOSC BSC -B: Performed by: INTERNAL MEDICINE

## 2018-03-01 PROCEDURE — 88305 TISSUE EXAM BY PATHOLOGIST: CPT | Performed by: INTERNAL MEDICINE

## 2018-03-01 PROCEDURE — 76060000032 HC ANESTHESIA 0.5 TO 1 HR: Performed by: INTERNAL MEDICINE

## 2018-03-01 RX ORDER — ATROPINE SULFATE 0.1 MG/ML
0.5 INJECTION INTRAVENOUS
Status: DISCONTINUED | OUTPATIENT
Start: 2018-03-01 | End: 2018-03-01 | Stop reason: HOSPADM

## 2018-03-01 RX ORDER — SODIUM CHLORIDE 0.9 % (FLUSH) 0.9 %
5-10 SYRINGE (ML) INJECTION AS NEEDED
Status: DISCONTINUED | OUTPATIENT
Start: 2018-03-01 | End: 2018-03-01 | Stop reason: HOSPADM

## 2018-03-01 RX ORDER — MIDAZOLAM HYDROCHLORIDE 1 MG/ML
5-10 INJECTION, SOLUTION INTRAMUSCULAR; INTRAVENOUS
Status: DISCONTINUED | OUTPATIENT
Start: 2018-03-01 | End: 2018-03-01 | Stop reason: HOSPADM

## 2018-03-01 RX ORDER — SODIUM CHLORIDE 0.9 % (FLUSH) 0.9 %
5-10 SYRINGE (ML) INJECTION EVERY 8 HOURS
Status: DISCONTINUED | OUTPATIENT
Start: 2018-03-01 | End: 2018-03-01 | Stop reason: HOSPADM

## 2018-03-01 RX ORDER — FENTANYL CITRATE 50 UG/ML
50-200 INJECTION, SOLUTION INTRAMUSCULAR; INTRAVENOUS
Status: DISCONTINUED | OUTPATIENT
Start: 2018-03-01 | End: 2018-03-01 | Stop reason: HOSPADM

## 2018-03-01 RX ORDER — SODIUM CHLORIDE 9 MG/ML
50 INJECTION, SOLUTION INTRAVENOUS CONTINUOUS
Status: DISCONTINUED | OUTPATIENT
Start: 2018-03-01 | End: 2018-03-01 | Stop reason: HOSPADM

## 2018-03-01 RX ORDER — NALOXONE HYDROCHLORIDE 0.4 MG/ML
0.4 INJECTION, SOLUTION INTRAMUSCULAR; INTRAVENOUS; SUBCUTANEOUS
Status: DISCONTINUED | OUTPATIENT
Start: 2018-03-01 | End: 2018-03-01 | Stop reason: HOSPADM

## 2018-03-01 RX ORDER — DEXTROMETHORPHAN/PSEUDOEPHED 2.5-7.5/.8
1.2 DROPS ORAL
Status: DISCONTINUED | OUTPATIENT
Start: 2018-03-01 | End: 2018-03-01 | Stop reason: HOSPADM

## 2018-03-01 RX ORDER — EPINEPHRINE 0.1 MG/ML
1 INJECTION INTRACARDIAC; INTRAVENOUS
Status: DISCONTINUED | OUTPATIENT
Start: 2018-03-01 | End: 2018-03-01 | Stop reason: HOSPADM

## 2018-03-01 RX ORDER — SODIUM CHLORIDE 9 MG/ML
INJECTION, SOLUTION INTRAVENOUS
Status: DISCONTINUED | OUTPATIENT
Start: 2018-03-01 | End: 2018-03-01 | Stop reason: HOSPADM

## 2018-03-01 RX ORDER — LIDOCAINE HYDROCHLORIDE 20 MG/ML
INJECTION, SOLUTION EPIDURAL; INFILTRATION; INTRACAUDAL; PERINEURAL AS NEEDED
Status: DISCONTINUED | OUTPATIENT
Start: 2018-03-01 | End: 2018-03-01 | Stop reason: HOSPADM

## 2018-03-01 RX ORDER — FLUMAZENIL 0.1 MG/ML
0.2 INJECTION INTRAVENOUS
Status: DISCONTINUED | OUTPATIENT
Start: 2018-03-01 | End: 2018-03-01 | Stop reason: HOSPADM

## 2018-03-01 RX ADMIN — LIDOCAINE HYDROCHLORIDE 20 MG: 20 INJECTION, SOLUTION EPIDURAL; INFILTRATION; INTRACAUDAL; PERINEURAL at 12:23

## 2018-03-01 RX ADMIN — SODIUM CHLORIDE: 9 INJECTION, SOLUTION INTRAVENOUS at 11:30

## 2018-03-01 RX ADMIN — LIDOCAINE HYDROCHLORIDE 30 MG: 20 INJECTION, SOLUTION EPIDURAL; INFILTRATION; INTRACAUDAL; PERINEURAL at 12:15

## 2018-03-01 RX ADMIN — LIDOCAINE HYDROCHLORIDE 40 MG: 20 INJECTION, SOLUTION EPIDURAL; INFILTRATION; INTRACAUDAL; PERINEURAL at 12:20

## 2018-03-01 RX ADMIN — LIDOCAINE HYDROCHLORIDE 20 MG: 20 INJECTION, SOLUTION EPIDURAL; INFILTRATION; INTRACAUDAL; PERINEURAL at 12:22

## 2018-03-01 NOTE — PROGRESS NOTES

## 2018-03-01 NOTE — H&P
[address] Maris Putnam MD, FACP, [address], [place]       Ane Livers, NP    Awilda Arce, GIGI Mahajan, ACNP-BC   Adrian Clifton, KATY Simons [facility] 136    at [address]    [address] [place] [address], [address] [address].    [phone]    FAX: [address] [place] [address]    at [facility], [address] [place] [address], [address]    [phone]    FAX: [phone]       PRE-PROCEDURE NOTE - EGD    H and P from last office visit reviewed. Allergies reviewed. Out-patient medicaton list reviewed. Patient Active Problem List   Diagnosis Code    Thrombocytopenia ([facility] D69.6    Hypertension I10    Hypercholesterolemia E78.00    Cirrhosis, cryptogenic ([facility] K74.69    Secondary esophageal varices without bleeding (HCC) I85.10    Type 2 diabetes mellitus with nephropathy (HCC) E11.21    Recurrent depression (HCC) F33.9       Allergies   Allergen Reactions    Percodan [Oxycodone Hcl-Oxycodone-Asa] Other (comments)       No current facility-administered medications on file prior to encounter. Current Outpatient Prescriptions on File Prior to Encounter   Medication Sig Dispense Refill    glipiZIDE SR (GLUCOTROL XL) 5 mg CR tablet       nitrofurantoin, macrocrystal-monohydrate, (MACROBID) 100 mg capsule       lisinopril (PRINIVIL, ZESTRIL) 10 mg tablet TAKE ONE TABLET BY MOUTH EVERY DAY  5    metFORMIN (GLUCOPHAGE) 1,000 mg tablet TAKE ONE TABLET BY MOUTH TWICE A DAY WITH MEALS  5    sertraline (ZOLOFT) 50 mg tablet TAKE ONE (1) TABLET(S) ONCE DAILY  0    simvastatin (ZOCOR) 10 mg tablet TAKE ONE TABLET BY MOUTH EVERY DAY IN THE EVENING  5       For EGD to assess for esophageal and gastric varices.   Plan to perform banding if indicated based upon variceal size and appearance. The risks of the procedure were discussed with the patient. These included reaction to anesthesia, pain, perforation and bleeding. All questions were answered. The patient wishes to proceed with the procedure. PHYSICAL EXAMINATION:  VS: per nursing note  General: No acute distress. Eyes: Sclera anicteric. ENT: No oral lesions. Thyroid normal.  Nodes: No adenopathy. Skin: No spider angiomata. No jaundice. No palmar erythema. Respiratory: Lungs clear to auscultation. Cardiovascular: Regular heart rate. No murmurs. No JVD. Abdomen: Soft non-tender, liver size normal to percussion/palpation. Spleen not palpable. No obvious ascites. Extremities: No edema. No muscle wasting. No gross arthritic changes. Neurologic: Alert and oriented. Cranial nerves grossly intact. No asterixis. MOST RECENT LABORATORY STUDIES:  Lab Results   Component Value Date/Time    WBC 5.2 12/19/2017 08:40 AM    HGB 12.0 12/19/2017 08:40 AM    HCT 36.4 12/19/2017 08:40 AM    PLATELET 74 (LL) 05/83/3943 08:40 AM    MCV 92 12/19/2017 08:40 AM     Lab Results   Component Value Date/Time    INR 1.2 06/19/2017 11:27 AM    INR 1.1 12/19/2016 03:25 PM    Prothrombin time 11.9 06/19/2017 11:27 AM    Prothrombin time 11.6 12/19/2016 03:25 PM       ASSESSMENT AND PLAN:  EGD to assess for esophageal and/or gastric varices. Conscious sedation with fentanyl and versed.     Chadwick Solomon MD  Liver [place] [address].  [phone]

## 2018-03-01 NOTE — IP AVS SNAPSHOT
[address] 
[phone] Patient: Julian Rodriguez MRN: [id number] QEN:[id number]/78/[id number] About your hospitalization You were admitted on:  March 1, 2018 You last received care in the:  [address] [place] [address] ENDOSCOPY You were discharged on:  March 1, 2018 Why you were hospitalized Your primary diagnosis was:  Not on File Follow-up Information Follow up With Details Comments Contact Info Perla Coulter MD   [address] [facility] 110 [facility] 7 [address] [phone] Discharge Orders None A check michael indicates which time of day the medication should be taken. My Medications CONTINUE taking these medications Instructions Each Dose to Equal  
 Morning Noon Evening Bedtime  
 glipiZIDE SR 5 mg CR tablet Commonly known as:  GLUCOTROL XL Your last dose was: Your next dose is:    
   
   
      
   
   
   
  
 lisinopril 10 mg tablet Commonly known as:  Bueno Boor Your last dose was: Your next dose is: TAKE ONE TABLET BY MOUTH EVERY DAY  
     
   
   
   
  
 metFORMIN 1,000 mg tablet Commonly known as:  GLUCOPHAGE Your last dose was: Your next dose is: TAKE ONE TABLET BY MOUTH TWICE A DAY WITH MEALS  
     
   
   
   
  
 nitrofurantoin (macrocrystal-monohydrate) 100 mg capsule Commonly known as:  MACROBID Your last dose was: Your next dose is:    
   
   
      
   
   
   
  
 sertraline 50 mg tablet Commonly known as:  ZOLOFT Your last dose was: Your next dose is: TAKE ONE (1) TABLET(S) ONCE DAILY  
     
   
   
   
  
 simvastatin 10 mg tablet Commonly known as:  ZOCOR Your last dose was: Your next dose is: TAKE ONE TABLET BY MOUTH EVERY DAY IN THE EVENING Discharge Instructions Li 59 9842 [facility]Th [facility] Laura Hernandez MD, AUBREY Gill, GIGI Mckinley, [facility]   Adrian Clifton, KATY Simons [facility] 136 
  at [address] 
  217 [facility] Tal Ramirez  22. 
  [phone] FAX: [address] [place] [address] Avenue 
  [facility] 
  [address] [facility] [address] [facility] [address], [address] 98 samantha Aldridge, [address] 
  [phone] FAX: [phone] ENDOSCOPY DISCHARGE INSTRUCTIONS Jaspreetchristiano Ally 1940 Date: 3/1/2018 DISCOMFORT: 
Use lozenges or warm salt water gargle for sore thoat Apply warm compress to IV site if red. If redness or soreness persists call the office. You may experience gas and bloating. Walking and belching will help relieve this. You may experience chest discomfort or pressure especially if banding of esophageal varices was performed. DIET: 
You may resume your normal diet. ACTIVITY: 
Spend the remainder of the day resting. Avoid any strenuous activity. You may not operate a vehicle for 12 hours. You may not engage in an occupation involving machinery or appliances for rest of today. Avoid making any critical or financial decisions for at least 24 hour. Call the [address] if you have any of the following: 
Increasing chest or abdominal pain, nausea, vomiting, vomiting blood, abdominal distension or swelling, fever or chills, bloody discharge from nose or mouth or shortness of breath. Follow-up Instructions: 
Call Dr. Andi Hester for any questions or problems at the phone number listed above. If a biopsy was performed, you will be contacted by the office staff or Dr Andi Hester within 1 week. If you have not heard from us by then you may call the office at the phone number listed above to inquire about the results. ENDOSCOPY FINDINGS: 
Your endoscopy demonstrated mild irritation in the stomach and intestine. A biopsy of the stomach and intestine was taken. DISCHARGE SUMMARY from the Nurse: The following personal items collected during your admission are returned to you:  
Dental Appliance: Dental Appliances: None Vision: Visual Aid: None Hearing Aid:   
Jewelry:   
Clothing:   
Other Valuables:   
Valuables sent to safe:   
 
 
  
  
  
Introducing [facility]! [employer] introduces [employer] patient portal. Now you can access parts of your medical record, email your doctor's office, and request medication refills online. 1. In your internet browser, go to [email or web address] 2. Click on the First Time User? Click Here link in the Sign In box. You will see the New Member Sign Up page. 3. Enter your [employer] Access Code exactly as it appears below. You will not need to use this code after youve completed the sign-up process. If you do not sign up before the expiration date, you must request a new code. · [employer] Access Code: [email or web address] Expires: 3/19/2018  8:34 AM 
 
4. Enter the last four digits of your Social Security Number (xxxx) and Date of Birth (mm/dd/yyyy) as indicated and click Submit. You will be taken to the next sign-up page. 5. Create a [employer] ID. This will be your [employer] login ID and cannot be changed, so think of one that is secure and easy to remember. 6. Create a [employer] password. You can change your password at any time. 7. Enter your Password Reset Question and Answer. This can be used at a later time if you forget your password. 8. Enter your e-mail address. You will receive e-mail notification when new information is available in [address] 9. Click Sign Up. You can now view and download portions of your medical record. 10. Click the Download Summary menu link to download a portable copy of your medical information. If you have questions, please visit the Frequently Asked Questions section of the [employer] website. Remember, [employer] is NOT to be used for urgent needs. For medical emergencies, dial 911. Now available from your iPhone and Android! Providers Seen During Your Hospitalization Provider Specialty Primary office phone Helene Leonard MD Hepatology [phone] Your Primary Care Physician (PCP) Primary Care Physician Office Phone Office Fax Home Rekha [phone] You are allergic to the following Allergen Reactions Percodan (Oxycodone Hcl-Oxycodone-Asa) Other (comments) Recent Documentation Height Weight Breastfeeding? BMI OB Status Smoking Status 1.422 m 53.6 kg No 26.5 kg/m2 Menopause Former Smoker Emergency Contacts Name Discharge Info Relation Home Work Mobile Javier Bardales DISCHARGE CAREGIVER [3] Spouse [3] [phone] Patient Belongings The following personal items are in your possession at time of discharge: 
  Dental Appliances: None  Visual Aid: None Please provide this summary of care documentation to your next provider. Signatures-by signing, you are acknowledging that this After Visit Summary has been reviewed with you and you have received a copy. Patient Signature:  ____________________________________________________________ Date:  ____________________________________________________________  
  
Dariana Romero Provider Signature:  ____________________________________________________________ Date:  ____________________________________________________________

## 2018-03-01 NOTE — ROUTINE PROCESS
Mireya Vasquez  1940  [id number]    Situation:  Verbal report received from: RN Edith Coulter  Procedure: Procedure(s):  ESOPHAGOGASTRODUODENOSCOPY (EGD)  ESOPHAGOGASTRODUODENAL (EGD) BIOPSY    Background:    Preoperative diagnosis: Cirrhosis, cryptogenic ([facility] [K74.69]  Postoperative diagnosis: 1.- Antritis  2.- Duodenitis  3.- Gastritis    :  Dr. Chris Mark  Assistant(s): Endoscopy Technician-1: Presley Yoon  Endoscopy RN-1: Andrey Pierre RN    Specimens:   ID Type Source Tests Collected by Time Destination   1 : Duodenum Biopsy Preservative   Veena Mead MD 3/1/2018 1230 Pathology   2 : Antrum Biopsy Preservative   Veena Mead MD 3/1/2018 1230 Pathology     H. Pylori  no    Assessment:  Intra-procedure medications       Anesthesia gave intra-procedure sedation and medications, see anesthesia flow sheet yes    Intravenous fluids:   300  NS @ KVO     Vital signs stable yes    Abdominal assessment: round and soft yes    Recommendation:  Discharge patient per MD order yes.   Return to floor no  Family or Friend :   Permission to share finding with family or friend yes

## 2018-03-01 NOTE — PROCEDURES
70 Maris Putnam MD, [address], [place], [place]       Lennette Drier, NP Michae Kindler, PA-C Gaylan Kocher, [facility]al, KATY Byrne [facility] 136    at [address], [address] Tal Ramirez  22.    [phone]    FAX: [address] [place] [address], [address] [place] [address] - [address]    [phone]    FAX: [phone]       UPPER ENDOSCOPY PROCEDURE NOTE    Madonna Ship  1940    INDICATION: Cirrhosis. Screening for esophageal varices with variceal ligation if  indicated. : Alice Ordonez MD    ANESTHESIA/SEDATION: Propofol was administered by anesthesia      PROCEDURE DESCRIPTION:  [employer] consent was obtained from the patient for the procedure. All risks and benefits of the procedure explained. The patient was taken to the endoscopy suite and placed in the left lateral decubitus position. Following sequential administration of sedation to doses as indicated above the endoscope was inserted into the mouth and advanced under direct vision to the second portion of the duodenum. Careful inspection of upper gastrointestinal tract was made as the endoscope was inserted and withdrawn. Retroflexion of the endoscope to view of the cardia of the stomach was performed. The findings and interventions are described below. FINDINGS:  Esophagus:    Normal.      Stomach:   No portal hypertensive gastropathy. Gastritis of the antrum,   No gastric varices identified. Duodenum:   Duodenitis without ulcer. INTERVENTION:   2 biopsies were taken from the antrum. The specimens were placed in preservative and transported to Pathology after the procedure.   1 biopsies were taken from the duodenum. The specimens were placed in preservative and transported to Pathology after the procedure. COMPLICATIONS: None. The patient tolerated the procedure well. EBL: Negligible. RECOMMENDATIONS:  Observe until discharge parameters are achieved. May resume previous diet. Repeat endoscopy to reassess varices and need for banding in 2 years. Follow-up [place] [facility] office as scheduled.       Nohemy Jesus MD  3/1/2018  12:36 PM

## 2018-03-01 NOTE — DISCHARGE INSTRUCTIONS
70 Maris Putnam MD, [place], [place], [place]       KATY Florez PA-C Blondell Bowen, KATLIN-BC   Bubba Crawford, KATY Olson [facility] 136    at [address]    [address] [place] [address], [address] [facility]    1400 [facility].    [phone]    FAX: [address] [place] [address] Avenue    [facility], [address] [facility], [address]    [phone]    FAX: [phone]       ENDOSCOPY DISCHARGE INSTRUCTIONS      Cong Kaleb  1940  Date: 3/1/2018    DISCOMFORT:  Use lozenges or warm salt water gargle for sore thoat  Apply warm compress to IV site if red. If redness or soreness persists call the office. You may experience gas and bloating. Walking and belching will help relieve this. You may experience chest discomfort or pressure especially if banding of esophageal varices was performed. DIET:  You may resume your normal diet. ACTIVITY:  Spend the remainder of the day resting. Avoid any strenuous activity. You may not operate a vehicle for 12 hours. You may not engage in an occupation involving machinery or appliances for rest of today. Avoid making any critical or financial decisions for at least 24 hour. Call the [employer] [address] if you have any of the following:  Increasing chest or abdominal pain, nausea, vomiting, vomiting blood, abdominal distension or swelling, fever or chills, bloody discharge from nose or mouth or shortness of breath. Follow-up Instructions:  Call Dr. Terese Bower for any questions or problems at the phone number listed above. If a biopsy was performed, you will be contacted by the office staff or Dr Terese Bower within 1 week.    If you have not heard from us by then you may call the office at the phone number listed above to inquire about the results. ENDOSCOPY FINDINGS:  Your endoscopy demonstrated mild irritation in the stomach and intestine. A biopsy of the stomach and intestine was taken. DISCHARGE SUMMARY from the Nurse:   The following personal items collected during your admission are returned to you:   Dental Appliance: Dental Appliances: None  Vision: Visual Aid: None  Hearing Aid:    Jewelry:    Clothing:    Other Valuables:    Valuables sent to safe:

## 2018-03-01 NOTE — ANESTHESIA POSTPROCEDURE EVALUATION
Post-Anesthesia Evaluation and Assessment    Patient: Roseline Ramsay MRN: [id number]  SSN: [id number]    YOB: 1940  Age: 68 y.o. Sex: female       Cardiovascular Function/Vital Signs  Visit Vitals    BP (!) 80/58    Pulse 73    Temp 36.7 °C (98.1 °F)    Resp 14    Ht 4' 8\" (1.422 m)    Wt 53.6 kg (118 lb 3 oz)    SpO2 95%    Breastfeeding No    BMI 26.5 kg/m2       Patient is status post MAC anesthesia for Procedure(s):  ESOPHAGOGASTRODUODENOSCOPY (EGD)  ESOPHAGOGASTRODUODENAL (EGD) BIOPSY. Nausea/Vomiting: None    Postoperative hydration reviewed and adequate. Pain:  Pain Scale 1: Numeric (0 - 10) (03/01/18 1240)  Pain Intensity 1: 0 (03/01/18 1240)   Managed    Neurological Status: At baseline    Mental Status and Level of Consciousness: Alert and oriented     Pulmonary Status:   O2 Device: Room air (03/01/18 1240)   Adequate oxygenation and airway patent    Complications related to anesthesia: None    Post-anesthesia assessment completed.  No concerns    Signed By: Abraham Waite DO     March 1, 2018

## 2018-03-01 NOTE — ANESTHESIA PREPROCEDURE EVALUATION
Anesthetic History   No history of anesthetic complications            Review of Systems / Medical History  Patient summary reviewed, nursing notes reviewed and pertinent labs reviewed    Pulmonary          Shortness of breath         Neuro/Psych         Psychiatric history     Cardiovascular    Hypertension              Exercise tolerance: <4 METS     GI/Hepatic/Renal           Liver disease    Comments: Secondary esophageal varices without bleeding (HCC)    Cirrhosis, cryptogenic ([address]  Endo/Other    Diabetes         Other Findings            Physical Exam    Airway  Mallampati: II  TM Distance: 4 - 6 cm  Neck ROM: decreased range of motion   Mouth opening: Normal     Cardiovascular  Regular rate and rhythm,  S1 and S2 normal,  no murmur, click, rub, or gallop  Rhythm: regular  Rate: normal         Dental    Dentition: Caps/crowns and Bridges     Pulmonary      Decreased breath sounds: bibasilar           Abdominal  GI exam deferred       Other Findings            Anesthetic Plan    ASA: 3  Anesthesia type: MAC          Induction: Intravenous  Anesthetic plan and risks discussed with: Patient

## 2018-06-19 ENCOUNTER — OFFICE VISIT (OUTPATIENT)
Dept: HEMATOLOGY | Age: 78
End: 2018-06-19

## 2018-06-19 VITALS
DIASTOLIC BLOOD PRESSURE: 46 MMHG | SYSTOLIC BLOOD PRESSURE: 126 MMHG | BODY MASS INDEX: 25.56 KG/M2 | WEIGHT: 114 LBS | HEART RATE: 75 BPM | TEMPERATURE: 98.5 F | OXYGEN SATURATION: 97 %

## 2018-06-19 DIAGNOSIS — E78.00 HYPERCHOLESTEROLEMIA: ICD-10-CM

## 2018-06-19 DIAGNOSIS — K74.69 CIRRHOSIS, CRYPTOGENIC (HCC): Primary | ICD-10-CM

## 2018-06-19 DIAGNOSIS — I10 ESSENTIAL HYPERTENSION: ICD-10-CM

## 2018-06-19 DIAGNOSIS — D69.6 THROMBOCYTOPENIA (HCC): ICD-10-CM

## 2018-06-19 DIAGNOSIS — E11.21 TYPE 2 DIABETES MELLITUS WITH NEPHROPATHY (HCC): ICD-10-CM

## 2018-06-19 DIAGNOSIS — K76.6 PORTAL HYPERTENSION (HCC): ICD-10-CM

## 2018-06-19 DIAGNOSIS — I85.10 SECONDARY ESOPHAGEAL VARICES WITHOUT BLEEDING (HCC): ICD-10-CM

## 2018-06-19 NOTE — MR AVS SNAPSHOT
2700 [facility] [phone] [address] [place] [address] 
[phone] Patient: Angela Stoddard MRN: [id number] ITY:89/70/1324 Visit Information Date & Time Provider Department Dept. Phone Encounter #  
 6/19/2018  9:00 AM Milena SANCHEZ Teileana Walsh, 3[address] [facility] 219 [id number] Follow-up Instructions Return in about 6 months (around 12/19/2018). Your Appointments 6/19/2018  9:00 AM  
Follow Up with Milena Brandon NP [address] ([facility]) Appt Note: 6  MONTH FOLLOW UP  
 15[facility] [phone] [facility] [address]  
59 [facility] [address] Upcoming Health Maintenance Date Due HEMOGLOBIN A1C Q6M 1940 LIPID PANEL Q1 1940 FOOT EXAM Q1 11/12/1950 MICROALBUMIN Q1 11/12/1950 EYE EXAM RETINAL OR DILATED Q1 11/12/1950 DTaP/Tdap/Td series (1 - Tdap) 11/12/1961 ZOSTER VACCINE AGE 60> 9/12/2000 GLAUCOMA SCREENING Q2Y 11/12/2005 Bone Densitometry (Dexa) Screening 11/12/2005 Pneumococcal 65+ Low/Medium Risk (1 of 2 - PCV13) 11/12/2005 MEDICARE YEARLY EXAM 3/14/2018 Influenza Age 5 to Adult 8/1/2018 Allergies as of 6/19/2018  Review Complete On: 6/19/2018 By: Milena Brandon NP Severity Noted Reaction Type Reactions Percodan [Oxycodone Hcl-oxycodone-asa]  09/29/2016    Other (comments) Current Immunizations  Reviewed on 10/25/2016 No immunizations on file. Not reviewed this visit You Were Diagnosed With   
  
 Codes Comments Cirrhosis, cryptogenic ([facility]    -  Primary ICD-10-CM: E41.61 ICD-9-CM: 571.5 Secondary esophageal varices without bleeding (HCC)     ICD-10-CM: I85.10 ICD-9-CM: 456.21 Thrombocytopenia ([facility]     ICD-10-CM: D69.6 ICD-9-CM: 287.5 Essential hypertension     ICD-10-CM: I10 
ICD-9-CM: 401.9  Hypercholesterolemia     ICD-10-CM: E78.00 
 ICD-9-CM: 272.0 Type 2 diabetes mellitus with nephropathy (HCC)     ICD-10-CM: E11.21 
ICD-9-CM: 250.40, 583.81 Portal hypertension (HCC)     ICD-10-CM: K76.6 ICD-9-CM: 572.3 Vitals BP Pulse Temp Weight(growth percentile) SpO2 BMI  
 126/46 (BP 1 Location: Left arm, BP Patient Position: Sitting) 75 98.5 °F (36.9 °C) (Tympanic) 114 lb (51.7 kg) 97% 25.56 kg/m2 OB Status Smoking Status Menopause Former Smoker BMI and BSA Data Body Mass Index Body Surface Area 25.56 kg/m 2 1.43 m 2 Your Updated Medication List  
  
   
This list is accurate as of 6/19/18  8:45 AM.  Always use your most recent med list.  
  
  
  
  
 glipiZIDE SR 5 mg CR tablet Commonly known as:  GLUCOTROL XL  
  
 lisinopril 10 mg tablet Commonly known as:  PRINIVIL, ZESTRIL  
TAKE ONE TABLET BY MOUTH EVERY DAY  
  
 metFORMIN 1,000 mg tablet Commonly known as:  GLUCOPHAGE  
TAKE ONE TABLET BY MOUTH TWICE A DAY WITH MEALS  
  
 nitrofurantoin (macrocrystal-monohydrate) 100 mg capsule Commonly known as:  MACROBID  
  
 sertraline 50 mg tablet Commonly known as:  ZOLOFT  
TAKE ONE (1) TABLET(S) ONCE DAILY  
  
 simvastatin 10 mg tablet Commonly known as:  ZOCOR  
TAKE ONE TABLET BY MOUTH EVERY DAY IN THE EVENING We Performed the Following AFP WITH AFP-L3% [[id number] Custom] CBC W/O DIFF [00499 CPT(R)] METABOLIC PANEL, COMPREHENSIVE [37151 CPT(R)] PROTHROMBIN TIME + INR [97156 CPT(R)] Follow-up Instructions Return in about 6 months (around 12/19/2018). To-Do List   
 07/19/2018 Imaging:  [employer] Introducing [facility]! [facility] introduces [employer] patient portal. Now you can access parts of your medical record, email your doctor's office, and request medication refills online. 1. In your internet browser, go to [email or web address] 2. Click on the First Time User? Click Here link in the Sign In box. You will see the New Member Sign Up page. 3. Enter your [employer] Access Code exactly as it appears below. You will not need to use this code after youve completed the sign-up process. If you do not sign up before the expiration date, you must request a new code. · [employer] Access Code: [email or web address] Expires: 8/20/2018  5:24 AM 
 
4. Enter the last four digits of your Social Security Number (xxxx) and Date of Birth (mm/dd/yyyy) as indicated and click Submit. You will be taken to the next sign-up page. 5. Create a [employer] ID. This will be your [employer] login ID and cannot be changed, so think of one that is secure and easy to remember. 6. Create a [employer] password. You can change your password at any time. 7. Enter your Password Reset Question and Answer. This can be used at a later time if you forget your password. 8. Enter your e-mail address. You will receive e-mail notification when new information is available in [address] 9. Click Sign Up. You can now view and download portions of your medical record. 10. Click the Download Summary menu link to download a portable copy of your medical information. If you have questions, please visit the Frequently Asked Questions section of the [employer] website. Remember, [employer] is NOT to be used for urgent needs. For medical emergencies, dial 911. Now available from your iPhone and Android! Please provide this summary of care documentation to your next provider. Your primary care clinician is listed as Carol Lewis. If you have any questions after today's visit, please call [phone].

## 2018-06-19 NOTE — PROGRESS NOTES
[address] MD, [facility], C[address] Magnus Perez, [facility] Marianela, NP    Jose Stone, GIGI Zayas, [facility]   KATY Menon NP        at [facility]     [address], [address] [facility], [address]     [phone]     FAX: [phone]    at [facility], [address] [place] [address], [address]     [phone]     FAX: [phone]     Patient Care Team:  Fabio Pavon MD as PCP - General (Family Practice)    Patient Active Problem List   Diagnosis Code    Thrombocytopenia ([facility] D69.6    Hypertension I10    Hypercholesterolemia E78.00    Cirrhosis, cryptogenic ([address] K74.69    Secondary esophageal varices without bleeding ([address] I85.10    Type 2 diabetes mellitus with nephropathy ([address] E11.21    Recurrent depression ([address] F33.9       Rashad Reyes returns to the [facility] regarding management of cryptogenic cirrhosis. The active problem list, all pertinent past medical history, medications, radiologic findings and laboratory findings related to the liver disorder were reviewed with the patient. The patient is a 68 y.o.  female who was first found to have chronic liver disease and cirrhosis in 11/2016 after she was noted to have thrombocytopenia. An assessment of liver fibrosis with biopsy or elastography has been performed and was consistent with cirrhosis. Patient had another EGD in June 2017. This demonstrated medium varices which required therapeutic banding. There were no complications. The patient has not developed any major complications of cirrhosis to date. The patient has not developed ascites. The patient has not developed hepatic encephalopathy.     The most recent laboratory studies indicate that the liver transaminases are normal, ALP is normal, tests of hepatic synthetic and metabolic function are normal, and the platelet count is depressed. Patient remains active. The patient has no symptoms which could be attributed to the liver disorder. The patient completes all daily activities without any functional limitations. The patient has not experienced fatigue, pain in the right side over the liver, problems concentrating, swelling of the abdomen, swelling of the lower extremities, hematemesis, hematochezia. ALLERGIES  Allergies   Allergen Reactions    Percodan [Oxycodone Hcl-Oxycodone-Asa] Other (comments)     MEDICATIONS  Current Outpatient Prescriptions   Medication Sig    glipiZIDE SR (GLUCOTROL XL) 5 mg CR tablet     nitrofurantoin, macrocrystal-monohydrate, (MACROBID) 100 mg capsule     lisinopril (PRINIVIL, ZESTRIL) 10 mg tablet TAKE ONE TABLET BY MOUTH EVERY DAY    metFORMIN (GLUCOPHAGE) 1,000 mg tablet TAKE ONE TABLET BY MOUTH TWICE A DAY WITH MEALS    sertraline (ZOLOFT) 50 mg tablet TAKE ONE (1) TABLET(S) ONCE DAILY    simvastatin (ZOCOR) 10 mg tablet TAKE ONE TABLET BY MOUTH EVERY DAY IN THE EVENING     No current facility-administered medications for this visit. SYSTEM REVIEW NOT RELATED TO LIVER DISEASE OR REVIEWED ABOVE:  Constitution systems: Negative for fever, chills, weight gain, weight loss. Eyes: Negative for visual changes. ENT: Negative for sore throat, painful swallowing. Respiratory: Negative for cough, hemoptysis, SOB. Cardiology: Negative for chest pain, palpitations. GI:  Negative for constipation or diarrhea. : Negative for urinary frequency, dysuria, hematuria, nocturia. Skin: Negative for rash. Hematology: Negative for easy bruising, blood clots. Musculo-skelatal: Negative for back pain, muscle pain, weakness. Neurologic: Negative for headaches, dizziness, vertigo, memory problems not related to HE. Psychology: Negative for anxiety, depression. FAMILY HISTORY:  The father  of cirrhosis probably from chronic HCV.     The mother  of cancer. There is no family history of liver disease. SOCIAL HISTORY:  The patient is . The patient has 2 children, 4 stepchildren and a lot grandchildren. The patient has never used tobacco products. The patient has never consumed significant amounts of alcohol. The patient used to work in [place]. The patient has not worked since 2009. PHYSICAL EXAMINATION:  Visit Vitals    /46 (BP 1 Location: Left arm, BP Patient Position: Sitting)    Pulse 75    Temp 98.5 °F (36.9 °C) (Tympanic)    Wt 114 lb (51.7 kg)    SpO2 97%    BMI 25.56 kg/m2     General: No acute distress. Eyes: Sclera anicteric. ENT: No oral lesions. Thyroid normal.  Nodes: No adenopathy. Skin: No spider angiomata. No jaundice. No palmar erythema. Respiratory: Lungs clear to auscultation. Cardiovascular: Regular heart rate. No murmurs. No JVD. Abdomen: Soft non-tender. Liver size normal to percussion/palpation. Spleen not palpable. No obvious ascites. Extremities: No edema. No muscle wasting. No gross arthritic changes. Neurologic: Alert and oriented. Cranial nerves grossly intact. No asterixis.     LABORATORY STUDIES:  Liver [place] of [address] & Units 6/19/2018 12/19/2017   WBC 3.4 - 10.8 x10E3/uL 4.1 5.2   ANC 1.4 - 7.0 x10E3/uL     HGB 11.1 - 15.9 g/dL 11.2 12.0    - 379 x10E3/uL 72 (LL) 74 (LL)   PLT       - 379 X10E3/uL     INR 0.8 - 1.2 1.2    AST 0 - 40 IU/L 43 (H) 41 (H)   ALT 0 - 32 IU/L 29 28   Alk Phos 39 - 117 IU/L 97 73   Bili, Total 0.0 - 1.2 mg/dL 0.8 0.7   Bili, Direct 0.00 - 0.40 mg/dL     Albumin 3.5 - 4.8 g/dL 4.0 4.2   BUN 8 - 27 mg/dL 16 14   Creat 0.57 - 1.00 mg/dL 1.30 (H) 0.88   Na 134 - 144 mmol/L 143 145 (H)   K 3.5 - 5.2 mmol/L 4.4 4.9   Cl 96 - 106 mmol/L 111 (H) 109 (H)   CO2 20 - 29 mmol/L 15 (L) 19   Glucose 65 - 99 mg/dL 89 48 (L)     SEROLOGIES:  Serologies Latest Ref Rng 9/29/2016   Hep B Surface Ag Negative Negative   Hep B Core Ab, Total Negative Negative   Hep B Surface AB QL  Reactive   Hep C Ab 0.0 - 0.9 s/co ratio <0.1   Ferritin 15 - 150 ng/mL 55     LIVER HISTOLOGY:  2/16/2017. [facility] at [facility]. EkPa was 21.1. Suggested fibrosis level is F4. ENDOSCOPIC PROCEDURES:  2/2017. EGD by MLS. A few small esophageal varices were identified. Banding of esophageal varices was performed. Mild portal hypertensive gastropathy of the body of the stomach. No gastric varicies identified. 6/2017. EGD by MLS. A few medium esophageal varices were identified. Banding (3) of esophageal varices was performed. Moderate portal hypertensive gastropathy of the antrum stomach. No gastric varicies identified. 3/2018. EGD by MLS. Normal esophagus. No portal hypertensive gastropathy. Gastritis of the antrum. No gastric varices identified.     RADIOLOGY:  10/2016. Ultrasound of liver. Echogenic consistent with cirrhosis. No liver mass lesions. No dilated bile ducts. No ascites. 11/2016. MRI scan abdomen. Changes consistent with cirrhosis. No liver mass lesions. No dilated bile ducts. No bile duct strictures. No ascites. 5/2017. Ultrasound of liver. Echogenic consistent with cirrhosis. No liver mass lesions. No dilated bile ducts. No ascites. 1/2018. Ultrasound of liver. Echogenic consistent with cirrhosis. No liver mass lesions. No dilated bile ducts. No ascites. OTHER TESTING:  Not available or performed    ASSESSMENT AND PLAN:  Cryptogenic cirrhosis. This is most likely due to MANUEL. Liver transaminases are normal. Alkaline phosphatase is normal. Liver function is normal. The platelet count is depressed. There is no reason to perform liver biopsy at this time. The patient has never developed any complications of cirrhosis to date. The CTP is 5. Child class A. The MELD score is 10. Esophageal varices. EGD in June 2017 demonstrated medium varices which required banding.  Repeat EGD in March 2018 demonstrated a normal esophagus. Next EGD will be in March 2020. Hepatic encephalopathy has not developed to date. There is no need for treatment with lactulose and/or Xifaxan at this time. No need to restrict dietary protein at this time. [address] screening. Last imaging ruled out [address] in January 2018. Next imaging is due in July 2018. This was ordered today. Discussed with patient the importance of getting this done. The patient was directed to continue all current medications at the current dosages. There are no contraindications for the patient to take any medications that are necessary for treatment of other medical issues. The patient was counseled regarding alcohol consumption. She does not consume alcohol. Thrombocytopenia secondary to cirrhosis. There is no evidence of overt bleeding. There is no indication for platelet transfusions or pharmacologic treatment to increase the platelet count. Bone density to assess for osteoporosis has not been performed. The need for vaccination against viral hepatitis A and B will be assessed with serologic and instituted as appropriate. All of the above issues were discussed with the patient. All questions were answered. The patient expressed a clear understanding of the above. [address] [place] [address] in 6 months.     Mey Johnson NP  Liver [place] [address]  Ph: [phone]  Fax: [phone]  Email: [email or web address]

## 2018-06-19 NOTE — PROGRESS NOTES
1. Have you been to the ER, urgent care clinic since your last visit? Hospitalized since your last visit? No    2. Have you seen or consulted any other health care providers outside of the [facility] since your last visit? Include any pap smears or colon screening. No   Chief Complaint   Patient presents with    Follow-up     Visit Vitals    /46 (BP 1 Location: Left arm, BP Patient Position: Sitting)    Pulse 75    Temp 98.5 °F (36.9 °C) (Tympanic)    Wt 114 lb (51.7 kg)    SpO2 97%    BMI 25.56 kg/m2     PHQ over the last two weeks 12/19/2017   Little interest or pleasure in doing things Not at all   Feeling down, depressed or hopeless Not at all   Total Score PHQ 2 0     Learning Assessment 6/19/2018   PRIMARY LEARNER Patient   HIGHEST LEVEL OF EDUCATION - PRIMARY LEARNER  -   BARRIERS PRIMARY LEARNER NONE   CO-LEARNER CAREGIVER No   CO-LEARNER NAME -   PRIMARY LANGUAGE ENGLISH    NEED -   LEARNER PREFERENCE PRIMARY LISTENING   ANSWERED BY patient    RELATIONSHIP SELF     Abuse Screening Questionnaire 6/19/2018   Do you ever feel afraid of your partner? N   Are you in a relationship with someone who physically or mentally threatens you? N   Is it safe for you to go home?  Andres Corea

## 2018-06-20 LAB
AFP L3 MFR SERPL: 8.9 % (ref 0–9.9)
AFP SERPL-MCNC: 4.2 NG/ML (ref 0–8)
ALBUMIN SERPL-MCNC: 4 G/DL (ref 3.5–4.8)
ALBUMIN/GLOB SERPL: 1.7 {RATIO} (ref 1.2–2.2)
ALP SERPL-CCNC: 97 IU/L (ref 39–117)
ALT SERPL-CCNC: 29 IU/L (ref 0–32)
AST SERPL-CCNC: 43 IU/L (ref 0–40)
BILIRUB SERPL-MCNC: 0.8 MG/DL (ref 0–1.2)
BUN SERPL-MCNC: 16 MG/DL (ref 8–27)
BUN/CREAT SERPL: 12 (ref 12–28)
CALCIUM SERPL-MCNC: 9.1 MG/DL (ref 8.7–10.3)
CHLORIDE SERPL-SCNC: 111 MMOL/L (ref 96–106)
CO2 SERPL-SCNC: 15 MMOL/L (ref 20–29)
CREAT SERPL-MCNC: 1.3 MG/DL (ref 0.57–1)
ERYTHROCYTE [DISTWIDTH] IN BLOOD BY AUTOMATED COUNT: 14.9 % (ref 12.3–15.4)
GFR SERPLBLD CREATININE-BSD FMLA CKD-EPI: 40 ML/MIN/1.73
GFR SERPLBLD CREATININE-BSD FMLA CKD-EPI: 46 ML/MIN/1.73
GLOBULIN SER CALC-MCNC: 2.3 G/DL (ref 1.5–4.5)
GLUCOSE SERPL-MCNC: 89 MG/DL (ref 65–99)
HCT VFR BLD AUTO: 34.6 % (ref 34–46.6)
HGB BLD-MCNC: 11.2 G/DL (ref 11.1–15.9)
INR PPP: 1.2 (ref 0.8–1.2)
MCH RBC QN AUTO: 30.2 PG (ref 26.6–33)
MCHC RBC AUTO-ENTMCNC: 32.4 G/DL (ref 31.5–35.7)
MCV RBC AUTO: 93 FL (ref 79–97)
MORPHOLOGY BLD-IMP: ABNORMAL
PLATELET # BLD AUTO: 72 X10E3/UL (ref 150–379)
POTASSIUM SERPL-SCNC: 4.4 MMOL/L (ref 3.5–5.2)
PROT SERPL-MCNC: 6.3 G/DL (ref 6–8.5)
PROTHROMBIN TIME: 12.4 SEC (ref 9.1–12)
RBC # BLD AUTO: 3.71 X10E6/UL (ref 3.77–5.28)
SODIUM SERPL-SCNC: 143 MMOL/L (ref 134–144)
WBC # BLD AUTO: 4.1 X10E3/UL (ref 3.4–10.8)

## 2018-08-06 ENCOUNTER — HOSPITAL ENCOUNTER (OUTPATIENT)
Dept: ULTRASOUND IMAGING | Age: 78
Discharge: HOME | End: 2018-08-06
Attending: NURSE PRACTITIONER
Payer: MEDICARE

## 2018-08-06 DIAGNOSIS — D69.6 THROMBOCYTOPENIA (HCC): ICD-10-CM

## 2018-08-06 DIAGNOSIS — I85.10 SECONDARY ESOPHAGEAL VARICES WITHOUT BLEEDING (HCC): ICD-10-CM

## 2018-08-06 DIAGNOSIS — K74.69 CIRRHOSIS, CRYPTOGENIC (HCC): ICD-10-CM

## 2018-08-06 DIAGNOSIS — K76.6 PORTAL HYPERTENSION (HCC): ICD-10-CM

## 2018-08-06 PROCEDURE — 76705 ECHO EXAM OF ABDOMEN: CPT

## 2018-12-17 ENCOUNTER — OFFICE VISIT (OUTPATIENT)
Dept: HEMATOLOGY | Age: 78
End: 2018-12-17

## 2018-12-17 VITALS
WEIGHT: 116 LBS | HEART RATE: 69 BPM | TEMPERATURE: 98 F | SYSTOLIC BLOOD PRESSURE: 115 MMHG | OXYGEN SATURATION: 98 % | BODY MASS INDEX: 26.01 KG/M2 | DIASTOLIC BLOOD PRESSURE: 45 MMHG

## 2018-12-17 DIAGNOSIS — I85.10 SECONDARY ESOPHAGEAL VARICES WITHOUT BLEEDING (HCC): ICD-10-CM

## 2018-12-17 DIAGNOSIS — D69.6 THROMBOCYTOPENIA (HCC): ICD-10-CM

## 2018-12-17 DIAGNOSIS — K74.69 CIRRHOSIS, CRYPTOGENIC (HCC): Primary | ICD-10-CM

## 2018-12-17 NOTE — PROGRESS NOTES
1. Have you been to the ER, urgent care clinic since your last visit? Hospitalized since your last visit? No    2. Have you seen or consulted any other health care providers outside of the [address] [place] [address] since your last visit? Include any pap smears or colon screening. No   Chief Complaint   Patient presents with    Follow-up     Visit Vitals  /45 (BP 1 Location: Left arm, BP Patient Position: Sitting)   Pulse 69   Temp 98 °F (36.7 °C) (Tympanic)   Wt 116 lb (52.6 kg)   SpO2 98%   BMI 26.01 kg/m²     PHQ over the last two weeks 12/17/2018   Little interest or pleasure in doing things Not at all   Feeling down, depressed, irritable, or hopeless Not at all   Total Score PHQ 2 0     Learning Assessment 12/17/2018   PRIMARY LEARNER Patient   HIGHEST LEVEL OF EDUCATION - PRIMARY LEARNER  -   BARRIERS PRIMARY LEARNER NONE   CO-LEARNER CAREGIVER No   CO-LEARNER NAME -   PRIMARY LANGUAGE ENGLISH    NEED -   LEARNER PREFERENCE PRIMARY LISTENING   ANSWERED BY patient   RELATIONSHIP SELF     Abuse Screening Questionnaire 12/17/2018   Do you ever feel afraid of your partner? N   Are you in a relationship with someone who physically or mentally threatens you? N   Is it safe for you to go home? Y     Fall Risk Assessment, last 12 mths 12/17/2018   Able to walk? Yes   Fall in past 12 months?  No

## 2018-12-17 NOTE — PROGRESS NOTES
[address] Aguilar MD, [facility], [address] Yang, [place]       KATY Ferrara PA-C Edilia Rudd, [facility]   KATY Schumacher NP        at [address], [address] [address]     [phone]     FAX: [phone]    at [address], [address] [place] [address], [address]     [phone]     FAX: [phone]     Patient Care Team:  Leif Hernandez MD as PCP - General (Family Practice)    Patient Active Problem List   Diagnosis Code    Thrombocytopenia ([facility] D69.6    Hypertension I10    Hypercholesterolemia E78.00    Cirrhosis, cryptogenic ([address] K74.69    Secondary esophageal varices without bleeding ([address] I85.10    Type 2 diabetes mellitus with nephropathy ([address] E11.21    Recurrent depression ([address] F33.9       Alberto Penn [facility] the [facility] regarding management of cryptogenic cirrhosis. The active problem list, all pertinent past medical history, medications, radiologic findings and laboratory findings related to the liver disorder were reviewed with the patient. The patient is a 66 y.o.  female who was first found to have chronic liver disease and cirrhosis in 11/2016 after she was noted to have thrombocytopenia. An assessment of liver fibrosis with biopsy or elastography has been performed and was consistent with cirrhosis. Patient had another EGD in March 2018. This demonstrated a normal esophagus. The patient has not developed any major complications of cirrhosis to date. The patient has not developed ascites. The patient has not developed hepatic encephalopathy. The most recent laboratory studies indicate that the liver transaminases are normal, ALP is normal, tests of hepatic synthetic and metabolic function are normal, and the platelet count is depressed.     Patient remains active. The patient has no symptoms which could be attributed to the liver disorder. The patient completes all daily activities without any functional limitations. The patient has not experienced fatigue, pain in the right side over the liver, problems concentrating, swelling of the abdomen, swelling of the lower extremities, hematemesis, hematochezia. ASSESSMENT AND PLAN:  Cryptogenic cirrhosis   This is most likely due to MANUEL. Liver transaminases are normal. Alkaline phosphatase is normal. Liver function is normal. The platelet count is depressed. There is no reason to perform liver biopsy at this time. The patient has never developed any complications of cirrhosis to date. The CTP is 5. Child class A. The MELD score is 10. Esophageal varices   EGD in June 2017 demonstrated medium varices which required banding. Repeat EGD in March 2018 demonstrated a normal esophagus. Next EGD will be in March 2020. Hepatic encephalopathy  Has not developed to date. There is no need for treatment with lactulose and/or Xifaxan at this time. No need to restrict dietary protein at this time. [address] screening  Last imaging ruled out [address] in August 2018. Next imaging will be due in February 2019. This was ordered today. Discussed with patient the importance of getting this done. The patient was directed to continue all current medications at the current dosages. There are no contraindications for the patient to take any medications that are necessary for treatment of other medical issues. Alcohol counseling   The patient was counseled regarding alcohol consumption. She does not consume alcohol. Thrombocytopenia  Secondary to cirrhosis. There is no evidence of overt bleeding. There is no indication for platelet transfusions or pharmacologic treatment to increase the platelet count.     ALLERGIES  Allergies   Allergen Reactions    Percodan [Oxycodone Hcl-Oxycodone-Asa] Other (comments) MEDICATIONS  Current Outpatient Medications   Medication Sig    glipiZIDE SR (GLUCOTROL XL) 5 mg CR tablet     nitrofurantoin, macrocrystal-monohydrate, (MACROBID) 100 mg capsule     lisinopril (PRINIVIL, ZESTRIL) 10 mg tablet TAKE ONE TABLET BY MOUTH EVERY DAY    metFORMIN (GLUCOPHAGE) 1,000 mg tablet TAKE ONE TABLET BY MOUTH TWICE A DAY WITH MEALS    sertraline (ZOLOFT) 50 mg tablet TAKE ONE (1) TABLET(S) ONCE DAILY    simvastatin (ZOCOR) 10 mg tablet TAKE ONE TABLET BY MOUTH EVERY DAY IN THE EVENING     No current facility-administered medications for this visit. SYSTEM REVIEW NOT RELATED TO LIVER DISEASE OR REVIEWED ABOVE:  Constitution systems: Negative for fever, chills, weight gain, weight loss. Eyes: Negative for visual changes. ENT: Negative for sore throat, painful swallowing. Respiratory: Negative for cough, hemoptysis, SOB. Cardiology: Negative for chest pain, palpitations. GI:  Negative for constipation or diarrhea. : Negative for urinary frequency, dysuria, hematuria, nocturia. Skin: Negative for rash. Hematology: Negative for easy bruising, blood clots. Musculo-skelatal: Negative for back pain, muscle pain, weakness. Neurologic: Negative for headaches, dizziness, vertigo, memory problems not related to HE. Psychology: Negative for anxiety, depression. FAMILY HISTORY:  The father  of cirrhosis probably from chronic HCV. The mother  of cancer. There is no family history of liver disease. SOCIAL HISTORY:  The patient is . The patient has 2 children, 4 stepchildren and a lot grandchildren. The patient has never used tobacco products. The patient has never consumed significant amounts of alcohol. The patient used to work in [employer]. The patient has not worked since .       PHYSICAL EXAMINATION:  Visit Vitals  /45 (BP 1 Location: Left arm, BP Patient Position: Sitting)   Pulse 69   Temp 98 °F (36.7 °C) (Tympanic) Wt 116 lb (52.6 kg)   SpO2 98%   BMI 26.01 kg/m²     General: No acute distress. Eyes: Sclera anicteric. ENT: No oral lesions. Thyroid normal.  Nodes: No adenopathy. Skin: No spider angiomata. No jaundice. No palmar erythema. Respiratory: Lungs clear to auscultation. Cardiovascular: Regular heart rate. No murmurs. No JVD. Abdomen: Soft non-tender. Liver size normal to percussion/palpation. Spleen not palpable. No obvious ascites. Extremities: No edema. No muscle wasting. No gross arthritic changes. Neurologic: Alert and oriented. Cranial nerves grossly intact. No asterixis. LABORATORY STUDIES:  Liver [place] of [address] [place] [address] & Units 12/17/2018 6/19/2018   WBC 3.4 - 10.8 x10E3/uL 3.8 4.1   ANC 1.4 - 7.0 x10E3/uL     HGB 11.1 - 15.9 g/dL 10.0 (L) 11.2    - 379 x10E3/uL 68 (LL) 72 (LL)   PLT       - 379 X10E3/uL     INR 0.8 - 1.2 1.2 1.2   AST 0 - 40 IU/L 36 43 (H)   ALT 0 - 32 IU/L 20 29   Alk Phos 39 - 117 IU/L 94 97   Bili, Total 0.0 - 1.2 mg/dL 0.7 0.8   Bili, Direct 0.00 - 0.40 mg/dL     Albumin 3.5 - 4.8 g/dL 3.7 4.0   BUN 8 - 27 mg/dL 18 16   Creat 0.57 - 1.00 mg/dL 1.66 (H) 1.30 (H)   Na 134 - 144 mmol/L 142 143   K 3.5 - 5.2 mmol/L 4.5 4.4   Cl 96 - 106 mmol/L 113 (H) 111 (H)   CO2 20 - 29 mmol/L 17 (L) 15 (L)   Glucose 65 - 99 mg/dL 93 89     SEROLOGIES:  Serologies Latest Ref Rng 9/29/2016   Hep B Surface Ag Negative Negative   Hep B Core Ab, Total Negative Negative   Hep B Surface AB QL  Reactive   Hep C Ab 0.0 - 0.9 s/co ratio <0.1   Ferritin 15 - 150 ng/mL 55     LIVER HISTOLOGY:  2/16/2017. FibroScan performed at [address]. EkPa was 21.1. Suggested fibrosis level is F4. ENDOSCOPIC PROCEDURES:  2/2017. EGD by MLS. A few small esophageal varices were identified. Banding of esophageal varices was performed. Mild portal hypertensive gastropathy of the body of the stomach. No gastric varicies identified. 6/2017. EGD by MLS.  A few medium esophageal varices were identified. Banding (3) of esophageal varices was performed. Moderate portal hypertensive gastropathy of the antrum stomach. No gastric varicies identified. 3/2018. EGD by MLS. Normal esophagus. No portal hypertensive gastropathy. Gastritis of the antrum. No gastric varices identified.     RADIOLOGY:  10/2016. Ultrasound of liver. Echogenic consistent with cirrhosis. No liver mass lesions. No dilated bile ducts. No ascites. 11/2016. MRI scan abdomen. Changes consistent with cirrhosis. No liver mass lesions. No dilated bile ducts. No bile duct strictures. No ascites. 5/2017. Ultrasound of liver. Echogenic consistent with cirrhosis. No liver mass lesions. No dilated bile ducts. No ascites. 1/2018. Ultrasound of liver. Echogenic consistent with cirrhosis. No liver mass lesions. No dilated bile ducts. No ascites. 8/2018. Ultrasound of liver. Echogenic consistent with cirrhosis. No liver mass lesions. No dilated bile ducts. Trace ascites. OTHER TESTING:  Not available or performed    All of the above issues were discussed with the patient. All questions were answered. The patient expressed a clear understanding of the above. [facility] in 6 months.     Steven Sifuentes NP  Liver [place] of [facility] [address], [facility], [address] [place] [address]  Ph: [phone]  Fax: [phone]

## 2018-12-18 LAB
AFP L3 MFR SERPL: 7.9 % (ref 0–9.9)
AFP SERPL-MCNC: 5 NG/ML (ref 0–8)
ALBUMIN SERPL-MCNC: 3.7 G/DL (ref 3.5–4.8)
ALBUMIN/GLOB SERPL: 1.5 {RATIO} (ref 1.2–2.2)
ALP SERPL-CCNC: 94 IU/L (ref 39–117)
ALT SERPL-CCNC: 20 IU/L (ref 0–32)
AST SERPL-CCNC: 36 IU/L (ref 0–40)
BILIRUB SERPL-MCNC: 0.7 MG/DL (ref 0–1.2)
BUN SERPL-MCNC: 18 MG/DL (ref 8–27)
BUN/CREAT SERPL: 11 (ref 12–28)
CALCIUM SERPL-MCNC: 8.5 MG/DL (ref 8.7–10.3)
CHLORIDE SERPL-SCNC: 113 MMOL/L (ref 96–106)
CO2 SERPL-SCNC: 17 MMOL/L (ref 20–29)
CREAT SERPL-MCNC: 1.66 MG/DL (ref 0.57–1)
ERYTHROCYTE [DISTWIDTH] IN BLOOD BY AUTOMATED COUNT: 15 % (ref 12.3–15.4)
GLOBULIN SER CALC-MCNC: 2.4 G/DL (ref 1.5–4.5)
GLUCOSE SERPL-MCNC: 93 MG/DL (ref 65–99)
HCT VFR BLD AUTO: 31.5 % (ref 34–46.6)
HGB BLD-MCNC: 10 G/DL (ref 11.1–15.9)
INR PPP: 1.2 (ref 0.8–1.2)
MCH RBC QN AUTO: 30.6 PG (ref 26.6–33)
MCHC RBC AUTO-ENTMCNC: 31.7 G/DL (ref 31.5–35.7)
MCV RBC AUTO: 96 FL (ref 79–97)
MORPHOLOGY BLD-IMP: ABNORMAL
PLATELET # BLD AUTO: 68 X10E3/UL (ref 150–379)
POTASSIUM SERPL-SCNC: 4.5 MMOL/L (ref 3.5–5.2)
PROT SERPL-MCNC: 6.1 G/DL (ref 6–8.5)
PROTHROMBIN TIME: 12.6 SEC (ref 9.1–12)
RBC # BLD AUTO: 3.27 X10E6/UL (ref 3.77–5.28)
SODIUM SERPL-SCNC: 142 MMOL/L (ref 134–144)
WBC # BLD AUTO: 3.8 X10E3/UL (ref 3.4–10.8)

## 2019-02-18 ENCOUNTER — HOSPITAL ENCOUNTER (OUTPATIENT)
Dept: ULTRASOUND IMAGING | Age: 79
Discharge: HOME | End: 2019-02-18
Attending: NURSE PRACTITIONER
Payer: MEDICARE

## 2019-02-18 DIAGNOSIS — K74.69 CIRRHOSIS, CRYPTOGENIC (HCC): ICD-10-CM

## 2019-02-18 PROCEDURE — 76705 ECHO EXAM OF ABDOMEN: CPT

## 2019-02-19 RX ORDER — SPIRONOLACTONE 50 MG/1
50 TABLET, FILM COATED ORAL DAILY
Qty: 30 TAB | Refills: 3 | Status: SHIPPED | OUTPATIENT
Start: 2019-02-19 | End: 2019-07-24 | Stop reason: SDUPTHER

## 2019-02-19 NOTE — PROGRESS NOTES
Called and spoke to patient regarding her most recent [address]. Report mentioned trace ascites. Patient complained of increased abdomina girth so a low dose diuretic was prescribed. Directed patient to take spironolactone 50 mg daily x 1 week and then hold it due to impaired renal function in December 2018. Encouraged patient to drink fluids to avoid dehydration. Also discussed following a low sodium diet to avoid fluid retention. Patient verbalized understanding.     April

## 2019-07-16 ENCOUNTER — OFFICE VISIT (OUTPATIENT)
Dept: HEMATOLOGY | Age: 79
End: 2019-07-16

## 2019-07-16 VITALS
OXYGEN SATURATION: 98 % | WEIGHT: 122.2 LBS | SYSTOLIC BLOOD PRESSURE: 131 MMHG | DIASTOLIC BLOOD PRESSURE: 56 MMHG | TEMPERATURE: 98.3 F | HEART RATE: 68 BPM | BODY MASS INDEX: 27.49 KG/M2 | HEIGHT: 56 IN

## 2019-07-16 DIAGNOSIS — K74.69 CIRRHOSIS, CRYPTOGENIC (HCC): Primary | ICD-10-CM

## 2019-07-16 NOTE — PROGRESS NOTES
Chief Complaint   Patient presents with    Follow-up     Visit Vitals  /56 (BP 1 Location: Left arm, BP Patient Position: Sitting)   Pulse 68   Temp 98.3 °F (36.8 °C) (Tympanic)   Ht 4' 8\" (1.422 m)   Wt 122 lb 3.2 oz (55.4 kg)   SpO2 98%   BMI 27.40 kg/m²     3 most recent PHQ Screens 12/17/2018   Little interest or pleasure in doing things Not at all   Feeling down, depressed, irritable, or hopeless Not at all   Total Score PHQ 2 0     Abuse Screening Questionnaire 12/17/2018   Do you ever feel afraid of your partner? N   Are you in a relationship with someone who physically or mentally threatens you? N   Is it safe for you to go home? Y     1. Have you been to the ER, urgent care clinic since your last visit? Hospitalized since your last visit? No    2. Have you seen or consulted any other health care providers outside of the [address] [place] [address] since your last visit? Include any pap smears or colon screening.  No

## 2019-07-16 NOTE — PROGRESS NOTES
[address] Livan Aguilar MD, [address], [facility], [place]       Mer Kincaid, GIGI Olivera Se, KATLIN-KATY Seth NP        at [address], [address] [facility], [address]     [phone]     FAX: [phone]    at [address], [address]     [phone]     FAX: [phone]     Patient Care Team:  Abdirashid Fenton MD as PCP - General (Family Practice)    Patient Active Problem List   Diagnosis Code    Thrombocytopenia ([facility] D69.6    Hypertension I10    Hypercholesterolemia E78.00    Cirrhosis, cryptogenic ([facility] K74.69    Secondary esophageal varices without bleeding ([facility] I85.10    Type 2 diabetes mellitus with nephropathy ([address] E11.21    Recurrent depression ([facility] F33.9     Rasheed Hallman returns to the [address] regarding management of cryptogenic cirrhosis. The active problem list, all pertinent past medical history, medications, radiologic findings and laboratory findings related to the liver disorder were reviewed with the patient. The patient is a 66 y.o.  female who was first found to have chronic liver disease and cirrhosis in 11/2016 after she was noted to have thrombocytopenia. An assessment of liver fibrosis with biopsy or elastography has been performed and was consistent with cirrhosis. Patient had another EGD in March 2018. This demonstrated a normal esophagus. The patient has not developed any major complications of cirrhosis to date. The patient has not developed ascites. The patient has not developed hepatic encephalopathy. The most recent laboratory studies indicate the liver transaminases are normal, ALP is normal, tests of hepatic synthetic and metabolic function are normal and the platelet count is depressed.     Patient remains active. The patient has no symptoms which could be attributed to the liver disorder. The patient completes all daily activities without any functional limitations. The patient has not experienced fatigue, pain in the right side over the liver, problems concentrating, swelling of the abdomen, swelling of the lower extremities, hematemesis or hematochezia. ASSESSMENT AND PLAN:  Cryptogenic cirrhosis   This is most likely due to MANUEL. Liver transaminases are normal. Alkaline phosphatase is normal. Liver function is normal. The platelet count is depressed. There is no reason to perform liver biopsy at this time. The patient has never developed any complications of cirrhosis to date. The CTP is 5. Child class A. The MELD score is 13. These numbers were calculated using labs from 12/2018. I will repeat the calculations with today's results. Esophageal varices   6/2017. EGD by MLS. Medium varices which required banding. 3/2018. EGD by MLS. Normal esophagus. Next EGD will be in March 2020. Hepatic encephalopathy  Has not developed to date. There is no need for treatment with lactulose and/or Xifaxan at this time. No need to restrict dietary protein at this time. [address] screening  Last imaging ruled out [address] in 2/2019. AFP was done in 12/2018. Ordering both to be performed today. The patient was directed to continue all current medications at the current dosages. There are no contraindications for the patient to take any medications necessary for treatment of other medical issues. Alcohol counseling   She does not consume alcohol. Thrombocytopenia  Secondary to cirrhosis. There is no evidence of overt bleeding. There is no indication for platelet transfusions or pharmacologic treatment to increase the platelet count.     ALLERGIES  Allergies   Allergen Reactions    Percodan [Oxycodone Hcl-Oxycodone-Asa] Other (comments)     MEDICATIONS  Current Outpatient Medications   Medication Sig    spironolactone (ALDACTONE) 50 mg tablet Take 1 Tab by mouth daily.  glipiZIDE SR (GLUCOTROL XL) 5 mg CR tablet     nitrofurantoin, macrocrystal-monohydrate, (MACROBID) 100 mg capsule     lisinopril (PRINIVIL, ZESTRIL) 10 mg tablet TAKE ONE TABLET BY MOUTH EVERY DAY    metFORMIN (GLUCOPHAGE) 1,000 mg tablet TAKE ONE TABLET BY MOUTH TWICE A DAY WITH MEALS    sertraline (ZOLOFT) 50 mg tablet TAKE ONE (1) TABLET(S) ONCE DAILY    simvastatin (ZOCOR) 10 mg tablet TAKE ONE TABLET BY MOUTH EVERY DAY IN THE EVENING     No current facility-administered medications for this visit. SYSTEM REVIEW NOT RELATED TO LIVER DISEASE OR REVIEWED ABOVE:  Constitution systems: Negative for fever, chills, weight gain, weight loss. Eyes: Negative for visual changes. ENT: Negative for sore throat, painful swallowing. Respiratory: Negative for cough, hemoptysis, SOB. Cardiology: Negative for chest pain, palpitations. GI:  Negative for constipation or diarrhea. : Negative for urinary frequency, dysuria, hematuria, nocturia. Skin: Negative for rash. Hematology: Negative for easy bruising, blood clots. Musculo-skeletal: Negative for back pain, muscle pain, weakness. Neurologic: Negative for headaches, dizziness, vertigo, memory problems not related to HE. Psychology: Negative for anxiety, depression. FAMILY HISTORY:  The father  of cirrhosis probably from chronic HCV. The mother  of cancer. There is no family history of liver disease. SOCIAL HISTORY:  The patient is . The patient has 2 children, 4 stepchildren and a lot grandchildren. The patient has never used tobacco products. The patient has never consumed significant amounts of alcohol. The patient used to work in [email or web address]. The patient has not worked since .       PHYSICAL EXAMINATION:  Visit Vitals  /56 (BP 1 Location: Left arm, BP Patient Position: Sitting)   Pulse 68   Temp 98.3 °F (36.8 °C) (Tympanic) Ht 4' 8\" (1.422 m)   Wt 122 lb 3.2 oz (55.4 kg)   SpO2 98%   BMI 27.40 kg/m²     General: No acute distress. Eyes: Sclera anicteric. ENT: No oral lesions. Nodes: No adenopathy. Skin: No spider angiomata. No jaundice. No palmar erythema. Respiratory: Lungs clear to auscultation. Cardiovascular: Regular heart rate. No murmurs. No JVD. Abdomen: Soft non-tender. Liver size normal to percussion/palpation. Spleen not palpable. No obvious ascites. Extremities: No edema. No muscle wasting. No gross arthritic changes. Neurologic: Alert and oriented. Cranial nerves grossly intact. No asterixis.     LABORATORY STUDIES:  [address] & Units 12/17/2018 6/19/2018   WBC 3.4 - 10.8 x10E3/uL 3.8 4.1   ANC 1.4 - 7.0 x10E3/uL     HGB 11.1 - 15.9 g/dL 10.0 (L) 11.2    - 379 x10E3/uL 68 (LL) 72 (LL)   PLT       - 379 X10E3/uL     INR 0.8 - 1.2 1.2 1.2   AST 0 - 40 IU/L 36 43 (H)   ALT 0 - 32 IU/L 20 29   Alk Phos 39 - 117 IU/L 94 97   Bili, Total 0.0 - 1.2 mg/dL 0.7 0.8   Bili, Direct 0.00 - 0.40 mg/dL     Albumin 3.5 - 4.8 g/dL 3.7 4.0   BUN 8 - 27 mg/dL 18 16   Creat 0.57 - 1.00 mg/dL 1.66 (H) 1.30 (H)   Na 134 - 144 mmol/L 142 143   K 3.5 - 5.2 mmol/L 4.5 4.4   Cl 96 - 106 mmol/L 113 (H) 111 (H)   CO2 20 - 29 mmol/L 17 (L) 15 (L)   Glucose 65 - 99 mg/dL 93 89     Liver [facility] Latest Ref Rng & Units 12/19/2017   WBC 3.4 - 10.8 x10E3/uL 5.2   ANC 1.4 - 7.0 x10E3/uL    HGB 11.1 - 15.9 g/dL 12.0    - 379 x10E3/uL 74 (LL)   PLT      - 379 X10E3/uL    INR 0.8 - 1.2    AST 0 - 40 IU/L 41 (H)   ALT 0 - 32 IU/L 28   Alk Phos 39 - 117 IU/L 73   Bili, Total 0.0 - 1.2 mg/dL 0.7   Bili, Direct 0.00 - 0.40 mg/dL    Albumin 3.5 - 4.8 g/dL 4.2   BUN 8 - 27 mg/dL 14   Creat 0.57 - 1.00 mg/dL 0.88   Na 134 - 144 mmol/L 145 (H)   K 3.5 - 5.2 mmol/L 4.9   Cl 96 - 106 mmol/L 109 (H)   CO2 20 - 29 mmol/L 19   Glucose 65 - 99 mg/dL 48 (L)     SEROLOGIES:  Serologies Latest Ref Rng 9/29/2016   Hep B Surface Ag Negative Negative   Hep B Core Ab, Total Negative Negative   Hep B Surface AB QL  Reactive   Hep C Ab 0.0 - 0.9 s/co ratio <0.1   Ferritin 15 - 150 ng/mL 55     LIVER HISTOLOGY:  2/16/2017. [facility]. EkPa was 21.1. Suggested fibrosis level is F4. ENDOSCOPIC PROCEDURES:  3/2018. EGD by MLS. Normal esophagus. No portal hypertensive gastropathy. Gastritis of the antrum. No gastric varices identified. Repeat     6/2017. EGD by MLS. A few medium esophageal varices were identified. Banding (3) of esophageal varices was performed. Moderate portal hypertensive gastropathy of the antrum stomach. No gastric varices identified. 2/2017. EGD by MLS. A few small esophageal varices were identified. Banding of esophageal varices was performed. Mild portal hypertensive gastropathy of the body of the stomach. No gastric varices identified.     RADIOLOGY:  2/2019. Ultrasound of the liver. Cirrhotic liver without focal liver lesion. Small volume ascites. Sludge and small stone in the gallbladder without gallbladder distention. 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 8/2018. Ultrasound of liver. Echogenic consistent with cirrhosis. No liver mass lesions. No dilated bile ducts. Trace ascites. 1/2018. Ultrasound of liver. Echogenic consistent with cirrhosis. No liver mass lesions. No dilated bile ducts. No ascites. 5/2017. Ultrasound of liver. Echogenic consistent with cirrhosis. No liver mass lesions. No dilated bile ducts. No ascites. 11/2016. MRI scan abdomen. Changes consistent with cirrhosis. No liver mass lesions. No dilated bile ducts. No bile duct strictures. No ascites. 10/2016. Ultrasound of liver. Echogenic consistent with cirrhosis. No liver mass lesions. No dilated bile ducts. No ascites.     OTHER TESTING:  Not available or performed    All of the above issues were discussed with the patient. All questions were answered. The patient expressed a clear understanding of the above. [address] [facility] in 6 months.     Maria Eugenia Bear, [facility]-BC  Liver [place] [facility] [address] [place], [address] [address].  [phone]

## 2019-07-17 LAB
AFP L3 MFR SERPL: 7.7 % (ref 0–9.9)
AFP SERPL-MCNC: 4.9 NG/ML (ref 0–8)
ALBUMIN SERPL-MCNC: 4 G/DL (ref 3.5–4.8)
ALP SERPL-CCNC: 92 IU/L (ref 39–117)
ALT SERPL-CCNC: 15 IU/L (ref 0–32)
AST SERPL-CCNC: 29 IU/L (ref 0–40)
BILIRUB DIRECT SERPL-MCNC: 0.36 MG/DL (ref 0–0.4)
BILIRUB SERPL-MCNC: 1.1 MG/DL (ref 0–1.2)
BUN SERPL-MCNC: 19 MG/DL (ref 8–27)
BUN/CREAT SERPL: 13 (ref 12–28)
CALCIUM SERPL-MCNC: 8.8 MG/DL (ref 8.7–10.3)
CHLORIDE SERPL-SCNC: 113 MMOL/L (ref 96–106)
CO2 SERPL-SCNC: 17 MMOL/L (ref 20–29)
CREAT SERPL-MCNC: 1.49 MG/DL (ref 0.57–1)
ERYTHROCYTE [DISTWIDTH] IN BLOOD BY AUTOMATED COUNT: 15.6 % (ref 12.3–15.4)
GLUCOSE SERPL-MCNC: 90 MG/DL (ref 65–99)
HCT VFR BLD AUTO: 33.7 % (ref 34–46.6)
HGB BLD-MCNC: 11.1 G/DL (ref 11.1–15.9)
INR PPP: 1.2 (ref 0.8–1.2)
MCH RBC QN AUTO: 32 PG (ref 26.6–33)
MCHC RBC AUTO-ENTMCNC: 32.9 G/DL (ref 31.5–35.7)
MCV RBC AUTO: 97 FL (ref 79–97)
MORPHOLOGY BLD-IMP: ABNORMAL
PLATELET # BLD AUTO: 61 X10E3/UL (ref 150–450)
POTASSIUM SERPL-SCNC: 4.3 MMOL/L (ref 3.5–5.2)
PROT SERPL-MCNC: 6.8 G/DL (ref 6–8.5)
PROTHROMBIN TIME: 12.6 SEC (ref 9.1–12)
RBC # BLD AUTO: 3.47 X10E6/UL (ref 3.77–5.28)
SODIUM SERPL-SCNC: 145 MMOL/L (ref 134–144)
WBC # BLD AUTO: 3.5 X10E3/UL (ref 3.4–10.8)

## 2019-07-22 ENCOUNTER — HOSPITAL ENCOUNTER (OUTPATIENT)
Dept: ULTRASOUND IMAGING | Age: 79
Discharge: HOME | End: 2019-07-22
Attending: NURSE PRACTITIONER
Payer: MEDICARE

## 2019-07-22 DIAGNOSIS — K74.69 CIRRHOSIS, CRYPTOGENIC (HCC): ICD-10-CM

## 2019-07-22 PROCEDURE — 76700 US EXAM ABDOM COMPLETE: CPT

## 2019-07-24 RX ORDER — SPIRONOLACTONE 50 MG/1
50 TABLET, FILM COATED ORAL DAILY
Qty: 30 TAB | Refills: 3 | Status: SHIPPED | OUTPATIENT
Start: 2019-07-24 | End: 2019-09-29 | Stop reason: SDUPTHER

## 2019-07-30 ENCOUNTER — DOCUMENTATION ONLY (OUTPATIENT)
Dept: HEMATOLOGY | Age: 79
End: 2019-07-30

## 2019-07-30 NOTE — PROGRESS NOTES
Received a request for medical records from [facility] (sent to [facility] via fax)

## 2019-10-01 RX ORDER — SPIRONOLACTONE 50 MG/1
TABLET, FILM COATED ORAL
Qty: 30 TAB | Refills: 2 | Status: SHIPPED | OUTPATIENT
Start: 2019-10-01

## 2019-10-16 ENCOUNTER — OFFICE VISIT (OUTPATIENT)
Dept: HEMATOLOGY | Age: 79
End: 2019-10-16

## 2019-10-16 VITALS
TEMPERATURE: 98.4 F | DIASTOLIC BLOOD PRESSURE: 63 MMHG | HEART RATE: 100 BPM | BODY MASS INDEX: 26.01 KG/M2 | SYSTOLIC BLOOD PRESSURE: 132 MMHG | OXYGEN SATURATION: 98 % | WEIGHT: 116 LBS

## 2019-10-16 DIAGNOSIS — K74.69 CIRRHOSIS, CRYPTOGENIC (HCC): Primary | ICD-10-CM

## 2019-10-16 PROBLEM — G30.9 ALZHEIMER'S DEMENTIA WITHOUT BEHAVIORAL DISTURBANCE (HCC): Status: ACTIVE | Noted: 2019-10-16

## 2019-10-16 PROBLEM — F02.80 ALZHEIMER'S DEMENTIA WITHOUT BEHAVIORAL DISTURBANCE (HCC): Status: ACTIVE | Noted: 2019-10-16

## 2019-10-16 NOTE — PROGRESS NOTES
Chief Complaint   Patient presents with    Follow-up     1. Have you been to the ER, urgent care clinic since your last visit? Hospitalized since your last visit? No    2. Have you seen or consulted any other health care providers outside of the [address] [place] [address] since your last visit? Include any pap smears or colon screening. No      3 most recent PHQ Screens 10/16/2019   Little interest or pleasure in doing things Not at all   Feeling down, depressed, irritable, or hopeless Not at all   Total Score PHQ 2 0     Abuse Screening Questionnaire 10/16/2019   Do you ever feel afraid of your partner? N   Are you in a relationship with someone who physically or mentally threatens you? N   Is it safe for you to go home?  Y     Learning Assessment 10/16/2019   PRIMARY LEARNER Patient   HIGHEST LEVEL OF EDUCATION - PRIMARY LEARNER  -   BARRIERS PRIMARY LEARNER HEARING   CO-LEARNER CAREGIVER No   CO-LEARNER NAME -   PRIMARY LANGUAGE ENGLISH    NEED -   LEARNER PREFERENCE PRIMARY DEMONSTRATION   ANSWERED BY patient   RELATIONSHIP SELF     Visit Vitals  /63 (BP 1 Location: Left arm, BP Patient Position: Sitting)   Pulse 100   Temp 98.4 °F (36.9 °C) (Tympanic)   Wt 116 lb (52.6 kg)   SpO2 98%   BMI 26.01 kg/m²

## 2019-10-16 NOTE — PROGRESS NOTES
[address] Livan Aguilar MD, [address], [facility] [place]       KATY Pierre PA-C [employer], [facility]   KATY Wolf NP        at [facility]     [address] [facility], [address] [address]     [phone]     FAX: [phone]    at [address], [address] [facility], [address]     [phone]     FAX: [phone]     Patient Care Team:  Darling Lopez MD as PCP - General (Family Practice)    Patient Active Problem List   Diagnosis Code    Thrombocytopenia ([facility] D69.6    Hypertension I10    Hypercholesterolemia E78.00    Cirrhosis, cryptogenic ([address] K74.69    Secondary esophageal varices without bleeding ([address] I85.10    Type 2 diabetes mellitus with nephropathy ([address] E11.21    Recurrent depression ([address] F33.9    Alzheimer's dementia without behavioral disturbance ([address] G30.9, F02.80     Irish Chin returns to the [address] regarding management of cryptogenic cirrhosis. The active problem list, all pertinent past medical history, medications, radiologic findings and laboratory findings related to the liver disorder were reviewed with the patient. The patient is a 66 y.o.  female who was first found to have chronic liver disease and cirrhosis in 11/2016 after she was noted to have thrombocytopenia. An assessment of liver fibrosis with biopsy or elastography has been performed and was consistent with cirrhosis. Patient had another EGD in March 2018. This demonstrated a normal esophagus. The patient has not developed any major complications of cirrhosis to date. The patient has not developed ascites. The patient has not developed hepatic encephalopathy.     The most recent laboratory studies indicate the liver transaminases are normal, ALP is normal, tests of hepatic synthetic and metabolic function are normal and the platelet count is depressed. The patient has no symptoms which could be attributed to the liver disorder. The patient has moderate limitations completing daily activities from symptoms from other diseases. The patient has not experienced fatigue, pain in the right side over the liver, problems concentrating, swelling of the abdomen, swelling of the lower extremities, hematemesis or hematochezia. Per her , Dr. Greg Martinez added a different memory medication Wednesday Oct 9.     ASSESSMENT AND PLAN:  Cryptogenic cirrhosis   This is most likely due to MANUEL. Liver transaminases are normal. Alkaline phosphatase is normal. Liver function is normal. The platelet count is depressed. There is no reason to perform liver biopsy at this time. The patient has never developed any complications of cirrhosis to date. Because of her age, she is not a candidate for a liver transplant. No longer need to keep up with CPS and MELD scores. The patient was directed to continue all current medications at the current dosages. There are no contraindications for the patient to take any medications necessary for treatment of other medical issues. Dementia  Her confusion does not seem like HE to me. I asked her permission to speak with her  after the visit and she said it was okay. He informed me she does have Alzheimer's disease and has not been sleeping or eating. Sitting in the home, asking to [place] [facility]. \"     Esophageal varices   3/2018. EGD by MLS. Normal esophagus. Next EGD will be in March 2020.     6/2017. EGD by MLS. Medium varices which required banding. We will schedule this today but advised the , we can eliminate this screening if not prudent based on his wife's cognitive function. We can re-evaluate at the next office visit. Hepatic encephalopathy  Has not developed to date.  There is no need for treatment with lactulose and/or Xifaxan at this time. No need to restrict dietary protein at this time. Due to her confusion, I will check an ammonia. I do not feel her confusion is from HE.     [address] screening  Last imaging ruled out [address] in 7/2019. AFP was done in 7/2019. Ordering both to be performed today. I have ordered an MRI in lieu of an US. Her last US showed lesions in her spleen which were noted to be new. They were identified during an MRI in 2016 and suggested hemangiomas. As it has been 3 years, I have requested an MRI be performed. Alcohol counseling   She does not consume alcohol. Thrombocytopenia  Secondary to cirrhosis. There is no evidence of overt bleeding. There is no indication for platelet transfusions or pharmacologic treatment to increase the platelet count. ALLERGIES  Allergies   Allergen Reactions    Percodan [Oxycodone Hcl-Oxycodone-Asa] Other (comments)     MEDICATIONS  Current Outpatient Medications   Medication Sig    spironolactone (ALDACTONE) 50 mg tablet TAKE ONE TABLET BY MOUTH DAILY    glipiZIDE SR (GLUCOTROL XL) 5 mg CR tablet     nitrofurantoin, macrocrystal-monohydrate, (MACROBID) 100 mg capsule     lisinopril (PRINIVIL, ZESTRIL) 10 mg tablet TAKE ONE TABLET BY MOUTH EVERY DAY    metFORMIN (GLUCOPHAGE) 1,000 mg tablet TAKE ONE TABLET BY MOUTH TWICE A DAY WITH MEALS    sertraline (ZOLOFT) 50 mg tablet TAKE ONE (1) TABLET(S) ONCE DAILY    simvastatin (ZOCOR) 10 mg tablet TAKE ONE TABLET BY MOUTH EVERY DAY IN THE EVENING     No current facility-administered medications for this visit. SYSTEM REVIEW NOT RELATED TO LIVER DISEASE OR REVIEWED ABOVE:  Constitution systems: Negative for fever, chills, weight gain, weight loss. Eyes: Negative for visual changes. ENT: Negative for sore throat, painful swallowing. Respiratory: Negative for cough, hemoptysis, SOB. Cardiology: Negative for chest pain, palpitations. GI:  Negative for constipation or diarrhea.   : Negative for urinary frequency, dysuria, hematuria, nocturia. Skin: Negative for rash. Hematology: Negative for easy bruising, blood clots. Musculo-skeletal: Negative for back pain, muscle pain, weakness. Neurologic: Negative for headaches, dizziness, vertigo, memory problems not related to HE. Psychology: Negative for anxiety, depression. FAMILY HISTORY:  The father  of cirrhosis probably from chronic HCV. The mother  of cancer. There is no family history of liver disease. SOCIAL HISTORY:  The patient is . The patient has 2 children, 4 stepchildren and a lot grandchildren. The patient has never used tobacco products. The patient has never consumed significant amounts of alcohol. The patient used to work in [employer]. The patient has not worked since . PHYSICAL EXAMINATION:  Visit Vitals  /63 (BP 1 Location: Left arm, BP Patient Position: Sitting)   Pulse 100   Temp 98.4 °F (36.9 °C) (Tympanic)   Wt 116 lb (52.6 kg)   SpO2 98%   BMI 26.01 kg/m²     General: No acute distress. Eyes: Sclera anicteric. ENT: No oral lesions. Nodes: No adenopathy. Skin: No spider angiomata. No jaundice. No palmar erythema. Respiratory: Lungs clear to auscultation. Cardiovascular: Regular heart rate. No murmurs. No JVD. Abdomen: Soft non-tender. Liver size normal to percussion/palpation. Spleen not palpable. No obvious ascites. Extremities: No edema. No muscle wasting. No gross arthritic changes. Neurologic: Alert and oriented. Cranial nerves grossly intact. No asterixis.     LABORATORY STUDIES:    Liver [place] of [address] Units 2018   WBC 3.4 - 10.8 x10E3/uL 3.8 4.1   ANC 1.4 - 7.0 x10E3/uL     HGB 11.1 - 15.9 g/dL 10.0 (L) 11.2    - 379 x10E3/uL 68 (LL) 72 (LL)   PLT       - 379 X10E3/uL     INR 0.8 - 1.2 1.2 1.2   AST 0 - 40 IU/L 36 43 (H)   ALT 0 - 32 IU/L 20 29   Alk Phos 39 - 117 IU/L 94 97   Bili, Total 0.0 - 1.2 mg/dL 0.7 0.8   Bili, Direct 0.00 - 0.40 mg/dL     Albumin 3.5 - 4.8 g/dL 3.7 4.0   BUN 8 - 27 mg/dL 18 16   Creat 0.57 - 1.00 mg/dL 1.66 (H) 1.30 (H)   Na 134 - 144 mmol/L 142 143   K 3.5 - 5.2 mmol/L 4.5 4.4   Cl 96 - 106 mmol/L 113 (H) 111 (H)   CO2 20 - 29 mmol/L 17 (L) 15 (L)   Glucose 65 - 99 mg/dL 93 89     Liver [place] [address] Ref Rng & Units 12/19/2017   WBC 3.4 - 10.8 x10E3/uL 5.2   ANC 1.4 - 7.0 x10E3/uL    HGB 11.1 - 15.9 g/dL 12.0    - 379 x10E3/uL 74 (LL)   PLT      - 379 X10E3/uL    INR 0.8 - 1.2    AST 0 - 40 IU/L 41 (H)   ALT 0 - 32 IU/L 28   Alk Phos 39 - 117 IU/L 73   Bili, Total 0.0 - 1.2 mg/dL 0.7   Bili, Direct 0.00 - 0.40 mg/dL    Albumin 3.5 - 4.8 g/dL 4.2   BUN 8 - 27 mg/dL 14   Creat 0.57 - 1.00 mg/dL 0.88   Na 134 - 144 mmol/L 145 (H)   K 3.5 - 5.2 mmol/L 4.9   Cl 96 - 106 mmol/L 109 (H)   CO2 20 - 29 mmol/L 19   Glucose 65 - 99 mg/dL 48 (L)     SEROLOGIES:  Serologies Latest Ref Rng 9/29/2016   Hep B Surface Ag Negative Negative   Hep B Core Ab, Total Negative Negative   Hep B Surface AB QL  Reactive   Hep C Ab 0.0 - 0.9 s/co ratio <0.1   Ferritin 15 - 150 ng/mL 55     LIVER HISTOLOGY:  2/16/2017. [facility] EkPa was 21.1. Suggested fibrosis level is F4. ENDOSCOPIC PROCEDURES:  3/2018. EGD by MLS. Normal esophagus. No portal hypertensive gastropathy. Gastritis of the antrum. No gastric varices identified. Repeat in 3/2020.    6/2017. EGD by MLS. A few medium esophageal varices were identified. Banding (3) of esophageal varices was performed. Moderate portal hypertensive gastropathy of the antrum stomach. No gastric varices identified. 2/2017. EGD by MLS. A few small esophageal varices were identified. Banding of esophageal varices was performed. Mild portal hypertensive gastropathy of the body of the stomach. No gastric varices identified.     RADIOLOGY:  2/2019. Ultrasound of the liver. Cirrhotic liver without focal liver lesion.  Small volume ascites. Sludge and small stone in the gallbladder without gallbladder distention. 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 8/2018. Ultrasound of liver. Echogenic consistent with cirrhosis. No liver mass lesions. No dilated bile ducts. Trace ascites. 1/2018. Ultrasound of liver. Echogenic consistent with cirrhosis. No liver mass lesions. No dilated bile ducts. No ascites. 5/2017. Ultrasound of liver. Echogenic consistent with cirrhosis. No liver mass lesions. No dilated bile ducts. No ascites. 11/2016. MRI scan abdomen. Changes consistent with cirrhosis. No liver mass lesions. No dilated bile ducts. No bile duct strictures. No ascites. 10/2016. Ultrasound of liver. Echogenic consistent with cirrhosis. No liver mass lesions. No dilated bile ducts. No ascites. OTHER TESTING:  Not available or performed    All of the above issues were discussed with the patient. All questions were answered. The patient expressed a clear understanding of the above. [facility] in 6 months.     KATLIN Vaughan-BC  Liver [place] [facility] [address] [place], [address] [address].  [phone]

## 2019-10-16 NOTE — LETTER
10/16/19 Patient: Genie Coates YOB: 1940 Date of Visit: 10/16/2019 Teresa Newton MD 
[address] [place] [address] [address] VIA Facsimile: [phone] Dear Teresa Newton MD, Thank you for [facility]. My notes for this consultation are attached. If you have questions, please do not hesitate to call me. I look forward to following your patient along with you. Sincerely, Maricruz Draper NP

## 2019-10-17 LAB
AFP L3 MFR SERPL: NORMAL % (ref 0–9.9)
AFP SERPL-MCNC: 3.4 NG/ML (ref 0–8)
ALBUMIN SERPL-MCNC: 4.3 G/DL (ref 3.5–4.8)
ALP SERPL-CCNC: 76 IU/L (ref 39–117)
ALT SERPL-CCNC: 18 IU/L (ref 0–32)
AMMONIA PLAS-MCNC: 56 UG/DL (ref 19–87)
AST SERPL-CCNC: 26 IU/L (ref 0–40)
BILIRUB DIRECT SERPL-MCNC: 0.43 MG/DL (ref 0–0.4)
BILIRUB SERPL-MCNC: 1.2 MG/DL (ref 0–1.2)
BUN SERPL-MCNC: 48 MG/DL (ref 8–27)
BUN/CREAT SERPL: 20 (ref 12–28)
CALCIUM SERPL-MCNC: 9.3 MG/DL (ref 8.7–10.3)
CHLORIDE SERPL-SCNC: 106 MMOL/L (ref 96–106)
CO2 SERPL-SCNC: 15 MMOL/L (ref 20–29)
CREAT SERPL-MCNC: 2.45 MG/DL (ref 0.57–1)
ERYTHROCYTE [DISTWIDTH] IN BLOOD BY AUTOMATED COUNT: 14.3 % (ref 12.3–15.4)
GLUCOSE SERPL-MCNC: 143 MG/DL (ref 65–99)
HCT VFR BLD AUTO: 40.8 % (ref 34–46.6)
HGB BLD-MCNC: 13.9 G/DL (ref 11.1–15.9)
INR PPP: 1.3 (ref 0.8–1.2)
MCH RBC QN AUTO: 31.1 PG (ref 26.6–33)
MCHC RBC AUTO-ENTMCNC: 34.1 G/DL (ref 31.5–35.7)
MCV RBC AUTO: 91 FL (ref 79–97)
MORPHOLOGY BLD-IMP: ABNORMAL
PLATELET # BLD AUTO: 66 X10E3/UL (ref 150–450)
POTASSIUM SERPL-SCNC: 4.8 MMOL/L (ref 3.5–5.2)
PROT SERPL-MCNC: 7.1 G/DL (ref 6–8.5)
PROTHROMBIN TIME: 13.4 SEC (ref 9.1–12)
RBC # BLD AUTO: 4.47 X10E6/UL (ref 3.77–5.28)
SODIUM SERPL-SCNC: 142 MMOL/L (ref 134–144)
WBC # BLD AUTO: 6.5 X10E3/UL (ref 3.4–10.8)

## 2019-11-14 ENCOUNTER — TELEPHONE (OUTPATIENT)
Dept: HEMATOLOGY | Age: 79
End: 2019-11-14

## 2019-11-19 ENCOUNTER — HOME HEALTH ADMISSION (OUTPATIENT)
Dept: HOME HEALTH SERVICES | Facility: HOME HEALTH | Age: 79
End: 2019-11-19
Payer: MEDICARE

## 2019-11-22 ENCOUNTER — HOME CARE VISIT (OUTPATIENT)
Dept: SCHEDULING | Facility: HOME HEALTH | Age: 79
End: 2019-11-22
Payer: MEDICARE

## 2019-11-22 ENCOUNTER — HOME CARE VISIT (OUTPATIENT)
Dept: SCHEDULING | Facility: HOME HEALTH | Age: 79
End: 2019-11-22

## 2019-11-22 VITALS
SYSTOLIC BLOOD PRESSURE: 102 MMHG | RESPIRATION RATE: 16 BRPM | HEART RATE: 64 BPM | TEMPERATURE: 98.4 F | HEART RATE: 64 BPM | OXYGEN SATURATION: 97 % | DIASTOLIC BLOOD PRESSURE: 60 MMHG | OXYGEN SATURATION: 97 % | TEMPERATURE: 98.4 F | SYSTOLIC BLOOD PRESSURE: 102 MMHG | DIASTOLIC BLOOD PRESSURE: 60 MMHG | RESPIRATION RATE: 16 BRPM

## 2019-11-22 PROCEDURE — G0152 HHCP-SERV OF OT,EA 15 MIN: HCPCS

## 2019-11-22 PROCEDURE — 400013 HH SOC

## 2019-11-22 PROCEDURE — G0299 HHS/HOSPICE OF RN EA 15 MIN: HCPCS

## 2019-11-25 ENCOUNTER — HOME CARE VISIT (OUTPATIENT)
Dept: SCHEDULING | Facility: HOME HEALTH | Age: 79
End: 2019-11-25
Payer: MEDICARE

## 2019-11-25 VITALS
RESPIRATION RATE: 18 BRPM | HEART RATE: 79 BPM | OXYGEN SATURATION: 98 % | SYSTOLIC BLOOD PRESSURE: 150 MMHG | DIASTOLIC BLOOD PRESSURE: 60 MMHG | TEMPERATURE: 97.4 F

## 2019-11-25 VITALS
HEART RATE: 76 BPM | DIASTOLIC BLOOD PRESSURE: 70 MMHG | TEMPERATURE: 98.2 F | OXYGEN SATURATION: 96 % | SYSTOLIC BLOOD PRESSURE: 126 MMHG | RESPIRATION RATE: 16 BRPM

## 2019-11-25 PROCEDURE — G0151 HHCP-SERV OF PT,EA 15 MIN: HCPCS

## 2019-11-25 PROCEDURE — G0152 HHCP-SERV OF OT,EA 15 MIN: HCPCS

## 2019-11-27 ENCOUNTER — HOME CARE VISIT (OUTPATIENT)
Dept: SCHEDULING | Facility: HOME HEALTH | Age: 79
End: 2019-11-27
Payer: MEDICARE

## 2019-11-27 VITALS
RESPIRATION RATE: 18 BRPM | OXYGEN SATURATION: 98 % | TEMPERATURE: 99.4 F | SYSTOLIC BLOOD PRESSURE: 98 MMHG | DIASTOLIC BLOOD PRESSURE: 60 MMHG | HEART RATE: 80 BPM

## 2019-11-27 PROCEDURE — G0299 HHS/HOSPICE OF RN EA 15 MIN: HCPCS

## 2019-11-29 ENCOUNTER — HOME CARE VISIT (OUTPATIENT)
Dept: SCHEDULING | Facility: HOME HEALTH | Age: 79
End: 2019-11-29
Payer: MEDICARE

## 2019-11-29 PROCEDURE — G0300 HHS/HOSPICE OF LPN EA 15 MIN: HCPCS

## 2019-12-02 VITALS
OXYGEN SATURATION: 96 % | HEART RATE: 81 BPM | SYSTOLIC BLOOD PRESSURE: 108 MMHG | DIASTOLIC BLOOD PRESSURE: 62 MMHG | WEIGHT: 111 LBS | TEMPERATURE: 98.7 F | BODY MASS INDEX: 24.89 KG/M2

## 2019-12-03 ENCOUNTER — HOME CARE VISIT (OUTPATIENT)
Dept: SCHEDULING | Facility: HOME HEALTH | Age: 79
End: 2019-12-03
Payer: MEDICARE

## 2019-12-03 VITALS
TEMPERATURE: 98.4 F | HEART RATE: 100 BPM | SYSTOLIC BLOOD PRESSURE: 128 MMHG | RESPIRATION RATE: 16 BRPM | OXYGEN SATURATION: 98 % | DIASTOLIC BLOOD PRESSURE: 62 MMHG

## 2019-12-03 PROCEDURE — G0151 HHCP-SERV OF PT,EA 15 MIN: HCPCS

## 2019-12-04 ENCOUNTER — HOME CARE VISIT (OUTPATIENT)
Dept: SCHEDULING | Facility: HOME HEALTH | Age: 79
End: 2019-12-04
Payer: MEDICARE

## 2019-12-04 VITALS
HEART RATE: 98 BPM | SYSTOLIC BLOOD PRESSURE: 100 MMHG | WEIGHT: 110 LBS | BODY MASS INDEX: 24.66 KG/M2 | OXYGEN SATURATION: 93 % | TEMPERATURE: 98.1 F | DIASTOLIC BLOOD PRESSURE: 56 MMHG

## 2019-12-04 PROCEDURE — G0300 HHS/HOSPICE OF LPN EA 15 MIN: HCPCS

## 2019-12-05 ENCOUNTER — HOME CARE VISIT (OUTPATIENT)
Dept: SCHEDULING | Facility: HOME HEALTH | Age: 79
End: 2019-12-05
Payer: MEDICARE

## 2019-12-05 VITALS
HEART RATE: 74 BPM | SYSTOLIC BLOOD PRESSURE: 114 MMHG | RESPIRATION RATE: 16 BRPM | TEMPERATURE: 98.2 F | OXYGEN SATURATION: 95 % | DIASTOLIC BLOOD PRESSURE: 66 MMHG

## 2019-12-05 PROCEDURE — G0151 HHCP-SERV OF PT,EA 15 MIN: HCPCS

## 2019-12-06 ENCOUNTER — HOME CARE VISIT (OUTPATIENT)
Dept: SCHEDULING | Facility: HOME HEALTH | Age: 79
End: 2019-12-06
Payer: MEDICARE

## 2019-12-06 PROCEDURE — G0299 HHS/HOSPICE OF RN EA 15 MIN: HCPCS

## 2019-12-08 VITALS
SYSTOLIC BLOOD PRESSURE: 104 MMHG | DIASTOLIC BLOOD PRESSURE: 60 MMHG | RESPIRATION RATE: 18 BRPM | OXYGEN SATURATION: 94 % | HEART RATE: 84 BPM | BODY MASS INDEX: 24.66 KG/M2 | WEIGHT: 110 LBS | TEMPERATURE: 98.4 F

## 2019-12-09 ENCOUNTER — HOME CARE VISIT (OUTPATIENT)
Dept: SCHEDULING | Facility: HOME HEALTH | Age: 79
End: 2019-12-09
Payer: MEDICARE

## 2019-12-09 VITALS
SYSTOLIC BLOOD PRESSURE: 110 MMHG | HEART RATE: 82 BPM | TEMPERATURE: 98 F | OXYGEN SATURATION: 98 % | RESPIRATION RATE: 16 BRPM | DIASTOLIC BLOOD PRESSURE: 58 MMHG

## 2019-12-09 PROCEDURE — G0151 HHCP-SERV OF PT,EA 15 MIN: HCPCS

## 2019-12-11 ENCOUNTER — HOME CARE VISIT (OUTPATIENT)
Dept: SCHEDULING | Facility: HOME HEALTH | Age: 79
End: 2019-12-11
Payer: MEDICARE

## 2019-12-11 VITALS
RESPIRATION RATE: 16 BRPM | HEART RATE: 86 BPM | DIASTOLIC BLOOD PRESSURE: 64 MMHG | TEMPERATURE: 98.2 F | SYSTOLIC BLOOD PRESSURE: 116 MMHG | OXYGEN SATURATION: 94 %

## 2019-12-12 ENCOUNTER — HOME CARE VISIT (OUTPATIENT)
Dept: SCHEDULING | Facility: HOME HEALTH | Age: 79
End: 2019-12-12
Payer: MEDICARE

## 2019-12-17 ENCOUNTER — TELEPHONE (OUTPATIENT)
Dept: HEMATOLOGY | Age: 79
End: 2019-12-17

## 2019-12-17 NOTE — TELEPHONE ENCOUNTER
Per Suresh Morataya  Called and Spoke to pt  today (Mr. Cordova Batters) and  Informed him that his wife does not need EGD procedure done per providers note in connect. He also said that his wife was recently discharge from the hospital due to kidney problems, pt is currently on kidney medication due to pt can no longer tolerate dialysis or surgery as per pt provider discharge instruction.      Cancelled today EGD on March 12, 2020

## 2019-12-20 ENCOUNTER — HOME CARE VISIT (OUTPATIENT)
Dept: SCHEDULING | Facility: HOME HEALTH | Age: 79
End: 2019-12-20
Payer: MEDICARE

## (undated) DEVICE — SYRINGE MED 20ML STD CLR PLAS LUERLOCK TIP N CTRL DISP

## (undated) DEVICE — NEEDLE HYPO 18GA L1.5IN PNK S STL HUB POLYPR SHLD REG BVL

## (undated) DEVICE — SET ADMIN 16ML TBNG L100IN 2 Y INJ SITE IV PIGGY BK DISP

## (undated) DEVICE — SOLIDIFIER FLUID 3000 CC ABSORB

## (undated) DEVICE — CUFF BLD PRSS CHILD SM SZ 8 FOR 12-16CM LIMB VYN SFT W/O TB

## (undated) DEVICE — FORCEPS BX L240CM JAW DIA2.8MM L CAP W/ NDL MIC MESH TOOTH

## (undated) DEVICE — CANN NASAL O2 CAPNOGRAPHY AD -- FILTERLINE

## (undated) DEVICE — KENDALL RADIOLUCENT FOAM MONITORING ELECTRODE -RECTANGULAR SHAPE: Brand: KENDALL

## (undated) DEVICE — ENDO CARRY-ON PROCEDURE KIT INCLUDES ENZYMATIC SPONGE, GAUZE, BIOHAZARD LABEL, TRAY, LUBRICANT, DIRTY SCOPE LABEL, WATER LABEL, TRAY, DRAWSTRING PAD, AND DEFENDO 4-PIECE KIT.: Brand: ENDO CARRY-ON PROCEDURE KIT

## (undated) DEVICE — 1200 GUARD II KIT W/5MM TUBE W/O VAC TUBE: Brand: GUARDIAN

## (undated) DEVICE — BW-412T DISP COMBO CLEANING BRUSH: Brand: SINGLE USE COMBINATION CLEANING BRUSH

## (undated) DEVICE — BAG BELONG PT PERS CLEAR HANDL

## (undated) DEVICE — CATH IV AUTOGRD BC BLU 22GA 25 -- INSYTE

## (undated) DEVICE — MULTIPLE BAND LIGATOR: Brand: SPEEDBAND SUPERVIEW SUPER 7

## (undated) DEVICE — NEONATAL-ADULT SPO2 SENSOR: Brand: NELLCOR

## (undated) DEVICE — WRISTBAND ID AD W1XL11.5IN RED POLY ALRG PREPRINTED PERM

## (undated) DEVICE — BITE BLK ENDOSCP AD 54FR GRN POLYETH ENDOSCP W STRP SLD

## (undated) DEVICE — CUFF BLD PRSS AD SM SZ 10 FOR 20-26CM LIMB VLY SFT W/O TUBE

## (undated) DEVICE — KIT IV STRT W CHLORAPREP PD 1ML

## (undated) DEVICE — SET EXTN TBNG L BOR 4 W STPCOCK ST 32IN PRIMING VOL 6ML

## (undated) DEVICE — Z DISCONTINUED NO SUB IDED SET EXTN W/ 4 W STPCOCK M SPIN LOK 36IN

## (undated) DEVICE — CONTAINER SPEC 20 ML LID NEUT BUFF FORMALIN 10 % POLYPR STS

## (undated) DEVICE — CUFF BLD PRSS CHILD SZ 9 FOR 15-21CM LIMB VYN SFT W/O TB

## (undated) DEVICE — CATH IV AUTOGRD BC YEL 24GA 19 -- INSYTE

## (undated) DEVICE — BAG SPEC BIOHZD LF 2MIL 6X10IN -- CONVERT TO ITEM 357326

## (undated) DEVICE — SYR 3ML LL TIP 1/10ML GRAD --

## (undated) DEVICE — SYR 5ML 1/5 GRAD LL NSAF LF --